# Patient Record
Sex: MALE | Race: WHITE | NOT HISPANIC OR LATINO | URBAN - NONMETROPOLITAN AREA
[De-identification: names, ages, dates, MRNs, and addresses within clinical notes are randomized per-mention and may not be internally consistent; named-entity substitution may affect disease eponyms.]

---

## 2023-08-26 LAB
ALANINE AMINOTRANSFERASE (SGPT) (U/L) IN SER/PLAS: 26 U/L (ref 10–52)
ALBUMIN (G/DL) IN SER/PLAS: 4.5 G/DL (ref 3.4–5)
ALKALINE PHOSPHATASE (U/L) IN SER/PLAS: 87 U/L (ref 33–136)
ANION GAP IN SER/PLAS: 16 MMOL/L (ref 10–20)
ASPARTATE AMINOTRANSFERASE (SGOT) (U/L) IN SER/PLAS: 20 U/L (ref 9–39)
BASOPHILS (10*3/UL) IN BLOOD BY AUTOMATED COUNT: 0.04 X10E9/L (ref 0–0.1)
BASOPHILS/100 LEUKOCYTES IN BLOOD BY AUTOMATED COUNT: 0.5 % (ref 0–2)
BILIRUBIN TOTAL (MG/DL) IN SER/PLAS: 0.7 MG/DL (ref 0–1.2)
CALCIUM (MG/DL) IN SER/PLAS: 10.4 MG/DL (ref 8.6–10.6)
CARBON DIOXIDE, TOTAL (MMOL/L) IN SER/PLAS: 24 MMOL/L (ref 21–32)
CHLORIDE (MMOL/L) IN SER/PLAS: 105 MMOL/L (ref 98–107)
CORTISOL (UG/DL) IN SERUM: 26.9 UG/DL (ref 2.5–20)
CREATININE (MG/DL) IN SER/PLAS: 1.06 MG/DL (ref 0.5–1.3)
EOSINOPHILS (10*3/UL) IN BLOOD BY AUTOMATED COUNT: 0.2 X10E9/L (ref 0–0.4)
EOSINOPHILS/100 LEUKOCYTES IN BLOOD BY AUTOMATED COUNT: 2.4 % (ref 0–6)
ERYTHROCYTE DISTRIBUTION WIDTH (RATIO) BY AUTOMATED COUNT: 13.4 % (ref 11.5–14.5)
ERYTHROCYTE MEAN CORPUSCULAR HEMOGLOBIN CONCENTRATION (G/DL) BY AUTOMATED: 32 G/DL (ref 32–36)
ERYTHROCYTE MEAN CORPUSCULAR VOLUME (FL) BY AUTOMATED COUNT: 90 FL (ref 80–100)
ERYTHROCYTES (10*6/UL) IN BLOOD BY AUTOMATED COUNT: 5.1 X10E12/L (ref 4.5–5.9)
FERRITIN (UG/LL) IN SER/PLAS: 361 UG/L (ref 20–300)
GFR MALE: 74 ML/MIN/1.73M2
GLUCOSE (MG/DL) IN SER/PLAS: 134 MG/DL (ref 74–99)
HEMATOCRIT (%) IN BLOOD BY AUTOMATED COUNT: 45.9 % (ref 41–52)
HEMOGLOBIN (G/DL) IN BLOOD: 14.7 G/DL (ref 13.5–17.5)
IMMATURE GRANULOCYTES/100 LEUKOCYTES IN BLOOD BY AUTOMATED COUNT: 0.5 % (ref 0–0.9)
IRON (UG/DL) IN SER/PLAS: 68 UG/DL (ref 35–150)
IRON BINDING CAPACITY (UG/DL) IN SER/PLAS: 300 UG/DL (ref 240–445)
IRON SATURATION (%) IN SER/PLAS: 23 % (ref 25–45)
LEUKOCYTES (10*3/UL) IN BLOOD BY AUTOMATED COUNT: 8.3 X10E9/L (ref 4.4–11.3)
LYMPHOCYTES (10*3/UL) IN BLOOD BY AUTOMATED COUNT: 1.99 X10E9/L (ref 0.8–3)
LYMPHOCYTES/100 LEUKOCYTES IN BLOOD BY AUTOMATED COUNT: 23.9 % (ref 13–44)
MONOCYTES (10*3/UL) IN BLOOD BY AUTOMATED COUNT: 0.77 X10E9/L (ref 0.05–0.8)
MONOCYTES/100 LEUKOCYTES IN BLOOD BY AUTOMATED COUNT: 9.3 % (ref 2–10)
NEUTROPHILS (10*3/UL) IN BLOOD BY AUTOMATED COUNT: 5.27 X10E9/L (ref 1.6–5.5)
NEUTROPHILS/100 LEUKOCYTES IN BLOOD BY AUTOMATED COUNT: 63.4 % (ref 40–80)
NRBC (PER 100 WBCS) BY AUTOMATED COUNT: 0 /100 WBC (ref 0–0)
PLATELETS (10*3/UL) IN BLOOD AUTOMATED COUNT: 256 X10E9/L (ref 150–450)
POTASSIUM (MMOL/L) IN SER/PLAS: 4.9 MMOL/L (ref 3.5–5.3)
PROTEIN TOTAL: 7 G/DL (ref 6.4–8.2)
SODIUM (MMOL/L) IN SER/PLAS: 140 MMOL/L (ref 136–145)
THYROTROPIN (MIU/L) IN SER/PLAS BY DETECTION LIMIT <= 0.05 MIU/L: 2.02 MIU/L (ref 0.44–3.98)
UREA NITROGEN (MG/DL) IN SER/PLAS: 21 MG/DL (ref 6–23)

## 2023-09-13 DIAGNOSIS — C67.9 MALIGNANT NEOPLASM OF URINARY BLADDER, UNSPECIFIED SITE (MULTI): Primary | ICD-10-CM

## 2023-09-25 VITALS — WEIGHT: 223.33 LBS | BODY MASS INDEX: 39.57 KG/M2 | HEIGHT: 63 IN

## 2023-09-25 PROBLEM — C67.9 BLADDER CANCER (MULTI): Status: ACTIVE | Noted: 2023-09-25

## 2023-09-25 RX ORDER — HEPARIN SODIUM,PORCINE/PF 10 UNIT/ML
50 SYRINGE (ML) INTRAVENOUS AS NEEDED
Status: CANCELLED | OUTPATIENT
Start: 2023-10-02

## 2023-09-25 RX ORDER — HEPARIN 100 UNIT/ML
500 SYRINGE INTRAVENOUS AS NEEDED
Status: CANCELLED | OUTPATIENT
Start: 2023-10-02

## 2023-10-03 RX ORDER — FAMOTIDINE 10 MG/ML
20 INJECTION INTRAVENOUS ONCE
Status: CANCELLED
Start: 2023-10-09 | End: 2023-10-09

## 2023-10-03 RX ORDER — ALBUTEROL SULFATE 0.83 MG/ML
3 SOLUTION RESPIRATORY (INHALATION) AS NEEDED
Status: CANCELLED | OUTPATIENT
Start: 2023-10-30

## 2023-10-03 RX ORDER — DEXAMETHASONE 4 MG/1
TABLET ORAL
Qty: 6 TABLET | Refills: 0 | Status: SHIPPED | OUTPATIENT
Start: 2023-10-03 | End: 2023-12-05 | Stop reason: ALTCHOICE

## 2023-10-03 RX ORDER — ALBUTEROL SULFATE 0.83 MG/ML
3 SOLUTION RESPIRATORY (INHALATION) AS NEEDED
Status: CANCELLED | OUTPATIENT
Start: 2023-11-06

## 2023-10-03 RX ORDER — PROCHLORPERAZINE EDISYLATE 5 MG/ML
10 INJECTION INTRAMUSCULAR; INTRAVENOUS EVERY 6 HOURS PRN
Status: CANCELLED | OUTPATIENT
Start: 2023-10-23

## 2023-10-03 RX ORDER — FAMOTIDINE 10 MG/ML
20 INJECTION INTRAVENOUS ONCE
Status: CANCELLED
Start: 2023-10-30 | End: 2023-10-30

## 2023-10-03 RX ORDER — FAMOTIDINE 10 MG/ML
20 INJECTION INTRAVENOUS ONCE AS NEEDED
Status: CANCELLED | OUTPATIENT
Start: 2023-10-16

## 2023-10-03 RX ORDER — PROCHLORPERAZINE EDISYLATE 5 MG/ML
10 INJECTION INTRAMUSCULAR; INTRAVENOUS EVERY 6 HOURS PRN
Status: CANCELLED | OUTPATIENT
Start: 2023-11-13

## 2023-10-03 RX ORDER — FAMOTIDINE 10 MG/ML
20 INJECTION INTRAVENOUS ONCE AS NEEDED
Status: CANCELLED | OUTPATIENT
Start: 2023-10-30

## 2023-10-03 RX ORDER — EPINEPHRINE 0.3 MG/.3ML
0.3 INJECTION SUBCUTANEOUS EVERY 5 MIN PRN
Status: CANCELLED | OUTPATIENT
Start: 2023-10-09

## 2023-10-03 RX ORDER — PROCHLORPERAZINE MALEATE 5 MG
10 TABLET ORAL EVERY 6 HOURS PRN
Status: CANCELLED | OUTPATIENT
Start: 2023-11-13

## 2023-10-03 RX ORDER — PROCHLORPERAZINE MALEATE 5 MG
10 TABLET ORAL EVERY 6 HOURS PRN
Status: CANCELLED | OUTPATIENT
Start: 2023-10-09

## 2023-10-03 RX ORDER — DIPHENHYDRAMINE HCL 25 MG
50 CAPSULE ORAL ONCE
Status: CANCELLED
Start: 2023-10-09 | End: 2023-10-09

## 2023-10-03 RX ORDER — PALONOSETRON 0.05 MG/ML
0.25 INJECTION, SOLUTION INTRAVENOUS ONCE
Status: CANCELLED | OUTPATIENT
Start: 2023-10-09

## 2023-10-03 RX ORDER — FAMOTIDINE 10 MG/ML
20 INJECTION INTRAVENOUS ONCE AS NEEDED
Status: CANCELLED | OUTPATIENT
Start: 2023-10-09

## 2023-10-03 RX ORDER — DIPHENHYDRAMINE HYDROCHLORIDE 50 MG/ML
50 INJECTION INTRAMUSCULAR; INTRAVENOUS AS NEEDED
Status: CANCELLED | OUTPATIENT
Start: 2023-11-13

## 2023-10-03 RX ORDER — PROCHLORPERAZINE EDISYLATE 5 MG/ML
10 INJECTION INTRAMUSCULAR; INTRAVENOUS EVERY 6 HOURS PRN
Status: CANCELLED | OUTPATIENT
Start: 2023-10-16

## 2023-10-03 RX ORDER — PROCHLORPERAZINE MALEATE 5 MG
10 TABLET ORAL EVERY 6 HOURS PRN
Status: CANCELLED | OUTPATIENT
Start: 2023-11-06

## 2023-10-03 RX ORDER — PROCHLORPERAZINE MALEATE 5 MG
10 TABLET ORAL EVERY 6 HOURS PRN
Status: CANCELLED | OUTPATIENT
Start: 2023-10-16

## 2023-10-03 RX ORDER — ALBUTEROL SULFATE 0.83 MG/ML
3 SOLUTION RESPIRATORY (INHALATION) AS NEEDED
Status: CANCELLED | OUTPATIENT
Start: 2023-10-16

## 2023-10-03 RX ORDER — PROCHLORPERAZINE MALEATE 5 MG
10 TABLET ORAL EVERY 6 HOURS PRN
Status: CANCELLED | OUTPATIENT
Start: 2023-10-30

## 2023-10-03 RX ORDER — PALONOSETRON 0.05 MG/ML
0.25 INJECTION, SOLUTION INTRAVENOUS ONCE
Status: CANCELLED | OUTPATIENT
Start: 2023-10-30

## 2023-10-03 RX ORDER — DIPHENHYDRAMINE HYDROCHLORIDE 50 MG/ML
50 INJECTION INTRAMUSCULAR; INTRAVENOUS AS NEEDED
Status: CANCELLED | OUTPATIENT
Start: 2023-11-06

## 2023-10-03 RX ORDER — FAMOTIDINE 10 MG/ML
20 INJECTION INTRAVENOUS ONCE AS NEEDED
Status: CANCELLED | OUTPATIENT
Start: 2023-11-06

## 2023-10-03 RX ORDER — ALBUTEROL SULFATE 0.83 MG/ML
3 SOLUTION RESPIRATORY (INHALATION) AS NEEDED
Status: CANCELLED | OUTPATIENT
Start: 2023-10-09

## 2023-10-03 RX ORDER — PALONOSETRON 0.05 MG/ML
0.25 INJECTION, SOLUTION INTRAVENOUS ONCE
Status: CANCELLED | OUTPATIENT
Start: 2023-11-06

## 2023-10-03 RX ORDER — DIPHENHYDRAMINE HYDROCHLORIDE 50 MG/ML
50 INJECTION INTRAMUSCULAR; INTRAVENOUS AS NEEDED
Status: CANCELLED | OUTPATIENT
Start: 2023-10-30

## 2023-10-03 RX ORDER — FAMOTIDINE 10 MG/ML
20 INJECTION INTRAVENOUS ONCE AS NEEDED
Status: CANCELLED | OUTPATIENT
Start: 2023-10-23

## 2023-10-03 RX ORDER — PROCHLORPERAZINE EDISYLATE 5 MG/ML
10 INJECTION INTRAMUSCULAR; INTRAVENOUS EVERY 6 HOURS PRN
Status: CANCELLED | OUTPATIENT
Start: 2023-10-09

## 2023-10-03 RX ORDER — PALONOSETRON 0.05 MG/ML
0.25 INJECTION, SOLUTION INTRAVENOUS ONCE
Status: CANCELLED | OUTPATIENT
Start: 2023-11-13

## 2023-10-03 RX ORDER — PROCHLORPERAZINE EDISYLATE 5 MG/ML
10 INJECTION INTRAMUSCULAR; INTRAVENOUS EVERY 6 HOURS PRN
Status: CANCELLED | OUTPATIENT
Start: 2023-10-30

## 2023-10-03 RX ORDER — PALONOSETRON 0.05 MG/ML
0.25 INJECTION, SOLUTION INTRAVENOUS ONCE
Status: CANCELLED | OUTPATIENT
Start: 2023-10-16

## 2023-10-03 RX ORDER — EPINEPHRINE 0.3 MG/.3ML
0.3 INJECTION SUBCUTANEOUS EVERY 5 MIN PRN
Status: CANCELLED | OUTPATIENT
Start: 2023-10-30

## 2023-10-03 RX ORDER — FAMOTIDINE 10 MG/ML
20 INJECTION INTRAVENOUS ONCE
Status: CANCELLED
Start: 2023-10-16 | End: 2023-10-16

## 2023-10-03 RX ORDER — FAMOTIDINE 10 MG/ML
20 INJECTION INTRAVENOUS ONCE
Status: CANCELLED
Start: 2023-10-23 | End: 2023-10-23

## 2023-10-03 RX ORDER — PALONOSETRON 0.05 MG/ML
0.25 INJECTION, SOLUTION INTRAVENOUS ONCE
Status: CANCELLED | OUTPATIENT
Start: 2023-10-23

## 2023-10-03 RX ORDER — DIPHENHYDRAMINE HYDROCHLORIDE 50 MG/ML
50 INJECTION INTRAMUSCULAR; INTRAVENOUS AS NEEDED
Status: CANCELLED | OUTPATIENT
Start: 2023-10-16

## 2023-10-03 RX ORDER — PROCHLORPERAZINE MALEATE 5 MG
10 TABLET ORAL EVERY 6 HOURS PRN
Status: CANCELLED | OUTPATIENT
Start: 2023-10-23

## 2023-10-03 RX ORDER — FAMOTIDINE 10 MG/ML
20 INJECTION INTRAVENOUS ONCE AS NEEDED
Status: CANCELLED | OUTPATIENT
Start: 2023-11-13

## 2023-10-03 RX ORDER — PROCHLORPERAZINE EDISYLATE 5 MG/ML
10 INJECTION INTRAMUSCULAR; INTRAVENOUS EVERY 6 HOURS PRN
Status: CANCELLED | OUTPATIENT
Start: 2023-11-06

## 2023-10-03 RX ORDER — EPINEPHRINE 0.3 MG/.3ML
0.3 INJECTION SUBCUTANEOUS EVERY 5 MIN PRN
Status: CANCELLED | OUTPATIENT
Start: 2023-10-23

## 2023-10-03 RX ORDER — EPINEPHRINE 0.3 MG/.3ML
0.3 INJECTION SUBCUTANEOUS EVERY 5 MIN PRN
Status: CANCELLED | OUTPATIENT
Start: 2023-10-16

## 2023-10-03 RX ORDER — FAMOTIDINE 10 MG/ML
20 INJECTION INTRAVENOUS ONCE
Status: CANCELLED
Start: 2023-11-13 | End: 2023-11-13

## 2023-10-03 RX ORDER — DIPHENHYDRAMINE HYDROCHLORIDE 50 MG/ML
50 INJECTION INTRAMUSCULAR; INTRAVENOUS AS NEEDED
Status: CANCELLED | OUTPATIENT
Start: 2023-10-23

## 2023-10-03 RX ORDER — ALBUTEROL SULFATE 0.83 MG/ML
3 SOLUTION RESPIRATORY (INHALATION) AS NEEDED
Status: CANCELLED | OUTPATIENT
Start: 2023-11-13

## 2023-10-03 RX ORDER — FAMOTIDINE 10 MG/ML
20 INJECTION INTRAVENOUS ONCE
Status: CANCELLED
Start: 2023-11-06 | End: 2023-11-06

## 2023-10-03 RX ORDER — EPINEPHRINE 0.3 MG/.3ML
0.3 INJECTION SUBCUTANEOUS EVERY 5 MIN PRN
Status: CANCELLED | OUTPATIENT
Start: 2023-11-13

## 2023-10-03 RX ORDER — DIPHENHYDRAMINE HYDROCHLORIDE 50 MG/ML
50 INJECTION INTRAMUSCULAR; INTRAVENOUS AS NEEDED
Status: CANCELLED | OUTPATIENT
Start: 2023-10-09

## 2023-10-03 RX ORDER — ALBUTEROL SULFATE 0.83 MG/ML
3 SOLUTION RESPIRATORY (INHALATION) AS NEEDED
Status: CANCELLED | OUTPATIENT
Start: 2023-10-23

## 2023-10-03 RX ORDER — EPINEPHRINE 0.3 MG/.3ML
0.3 INJECTION SUBCUTANEOUS EVERY 5 MIN PRN
Status: CANCELLED | OUTPATIENT
Start: 2023-11-06

## 2023-10-06 ENCOUNTER — LAB (OUTPATIENT)
Dept: LAB | Facility: CLINIC | Age: 73
End: 2023-10-06
Payer: MEDICARE

## 2023-10-06 ENCOUNTER — TELEPHONE (OUTPATIENT)
Dept: HEMATOLOGY/ONCOLOGY | Facility: HOSPITAL | Age: 73
End: 2023-10-06

## 2023-10-06 DIAGNOSIS — C67.9 MALIGNANT NEOPLASM OF URINARY BLADDER, UNSPECIFIED SITE (MULTI): Primary | ICD-10-CM

## 2023-10-06 PROBLEM — G89.29 CHRONIC PAIN IN LEFT FOOT: Status: ACTIVE | Noted: 2023-10-06

## 2023-10-06 PROBLEM — G89.29 CHRONIC PAIN OF BOTH ANKLES: Status: ACTIVE | Noted: 2023-10-06

## 2023-10-06 PROBLEM — M79.671 CHRONIC PAIN IN RIGHT FOOT: Status: ACTIVE | Noted: 2023-10-06

## 2023-10-06 PROBLEM — M19.072 PRIMARY OSTEOARTHRITIS OF BOTH FEET: Status: ACTIVE | Noted: 2023-10-06

## 2023-10-06 PROBLEM — M19.071 PRIMARY OSTEOARTHRITIS OF BOTH FEET: Status: ACTIVE | Noted: 2023-10-06

## 2023-10-06 PROBLEM — M79.672 CHRONIC PAIN IN LEFT FOOT: Status: ACTIVE | Noted: 2023-10-06

## 2023-10-06 PROBLEM — R73.9 HYPERGLYCEMIA: Status: ACTIVE | Noted: 2023-10-06

## 2023-10-06 PROBLEM — M25.571 CHRONIC PAIN OF BOTH ANKLES: Status: ACTIVE | Noted: 2023-10-06

## 2023-10-06 PROBLEM — E78.00 HYPERCHOLESTEROLEMIA: Status: ACTIVE | Noted: 2023-10-06

## 2023-10-06 PROBLEM — G89.29 CHRONIC PAIN IN RIGHT FOOT: Status: ACTIVE | Noted: 2023-10-06

## 2023-10-06 PROBLEM — M25.572 CHRONIC PAIN OF BOTH ANKLES: Status: ACTIVE | Noted: 2023-10-06

## 2023-10-06 PROBLEM — M10.9 GOUT: Status: ACTIVE | Noted: 2023-10-06

## 2023-10-06 PROBLEM — G62.9 PERIPHERAL NEUROPATHY: Status: ACTIVE | Noted: 2023-10-06

## 2023-10-06 PROBLEM — I25.10 ASHD (ARTERIOSCLEROTIC HEART DISEASE): Status: ACTIVE | Noted: 2023-10-06

## 2023-10-06 PROBLEM — M17.0 PRIMARY OSTEOARTHRITIS OF BOTH KNEES: Status: ACTIVE | Noted: 2023-10-06

## 2023-10-06 PROBLEM — I10 HTN (HYPERTENSION): Status: ACTIVE | Noted: 2023-10-06

## 2023-10-06 PROBLEM — M19.90 OSTEOARTHRITIS: Status: ACTIVE | Noted: 2023-10-06

## 2023-10-06 LAB
BASOPHILS # BLD AUTO: 0.04 X10*3/UL (ref 0–0.1)
BASOPHILS NFR BLD AUTO: 0.5 %
EOSINOPHIL # BLD AUTO: 0.22 X10*3/UL (ref 0–0.4)
EOSINOPHIL NFR BLD AUTO: 3 %
ERYTHROCYTE [DISTWIDTH] IN BLOOD BY AUTOMATED COUNT: 14 % (ref 11.5–14.5)
HCT VFR BLD AUTO: 40.9 % (ref 41–52)
HGB BLD-MCNC: 13.6 G/DL (ref 13.5–17.5)
IMM GRANULOCYTES # BLD AUTO: 0.02 X10*3/UL (ref 0–0.5)
IMM GRANULOCYTES NFR BLD AUTO: 0.3 % (ref 0–0.9)
LYMPHOCYTES # BLD AUTO: 1.93 X10*3/UL (ref 0.8–3)
LYMPHOCYTES NFR BLD AUTO: 26.1 %
MCH RBC QN AUTO: 30.2 PG (ref 26–34)
MCHC RBC AUTO-ENTMCNC: 33.3 G/DL (ref 32–36)
MCV RBC AUTO: 91 FL (ref 80–100)
MONOCYTES # BLD AUTO: 0.63 X10*3/UL (ref 0.05–0.8)
MONOCYTES NFR BLD AUTO: 8.5 %
NEUTROPHILS # BLD AUTO: 4.56 X10*3/UL (ref 1.6–5.5)
NEUTROPHILS NFR BLD AUTO: 61.6 %
NRBC BLD-RTO: ABNORMAL /100{WBCS}
PLATELET # BLD AUTO: 201 X10*3/UL (ref 150–450)
PMV BLD AUTO: 10.1 FL (ref 7.5–11.5)
RBC # BLD AUTO: 4.51 X10*6/UL (ref 4.5–5.9)
WBC # BLD AUTO: 7.4 X10*3/UL (ref 4.4–11.3)

## 2023-10-06 PROCEDURE — 36415 COLL VENOUS BLD VENIPUNCTURE: CPT

## 2023-10-06 PROCEDURE — 80053 COMPREHEN METABOLIC PANEL: CPT | Performed by: INTERNAL MEDICINE

## 2023-10-06 PROCEDURE — 83735 ASSAY OF MAGNESIUM: CPT | Performed by: INTERNAL MEDICINE

## 2023-10-06 PROCEDURE — 86706 HEP B SURFACE ANTIBODY: CPT | Performed by: INTERNAL MEDICINE

## 2023-10-06 PROCEDURE — 86704 HEP B CORE ANTIBODY TOTAL: CPT | Performed by: INTERNAL MEDICINE

## 2023-10-06 PROCEDURE — 87340 HEPATITIS B SURFACE AG IA: CPT | Performed by: INTERNAL MEDICINE

## 2023-10-06 PROCEDURE — 85025 COMPLETE CBC W/AUTO DIFF WBC: CPT | Performed by: INTERNAL MEDICINE

## 2023-10-06 RX ORDER — LISINOPRIL 20 MG/1
20 TABLET ORAL DAILY
COMMUNITY
Start: 2020-03-06

## 2023-10-06 RX ORDER — ALLOPURINOL 100 MG/1
100 TABLET ORAL 2 TIMES DAILY
COMMUNITY

## 2023-10-06 RX ORDER — TRIAMTERENE AND HYDROCHLOROTHIAZIDE 37.5; 25 MG/1; MG/1
1 CAPSULE ORAL EVERY OTHER DAY
COMMUNITY
Start: 2016-02-05

## 2023-10-06 RX ORDER — NITROGLYCERIN 0.4 MG/1
0.4 TABLET SUBLINGUAL EVERY 5 MIN PRN
COMMUNITY
Start: 2023-09-15 | End: 2023-12-05 | Stop reason: ALTCHOICE

## 2023-10-06 RX ORDER — ALENDRONATE SODIUM 70 MG/75ML
70 SOLUTION ORAL
COMMUNITY
End: 2023-12-05 | Stop reason: ALTCHOICE

## 2023-10-06 RX ORDER — ATORVASTATIN CALCIUM 20 MG/1
20 TABLET, FILM COATED ORAL DAILY
COMMUNITY
Start: 2017-11-20

## 2023-10-06 RX ORDER — METOPROLOL SUCCINATE 50 MG/1
50 TABLET, EXTENDED RELEASE ORAL DAILY
COMMUNITY
Start: 2014-10-27

## 2023-10-06 RX ORDER — PHENYLEPHRINE HCL 10 MG
TABLET ORAL
COMMUNITY

## 2023-10-06 RX ORDER — PEGLOTICASE 8 MG/ML
INJECTION, SOLUTION INTRAVENOUS
COMMUNITY
End: 2023-12-05 | Stop reason: ALTCHOICE

## 2023-10-06 RX ORDER — TETRACYCLINE HCL 500 MG
CAPSULE ORAL
COMMUNITY
End: 2023-12-05 | Stop reason: ALTCHOICE

## 2023-10-06 RX ORDER — VIT C/E/ZN/COPPR/LUTEIN/ZEAXAN 250MG-90MG
1000 CAPSULE ORAL DAILY
COMMUNITY
Start: 2019-08-31 | End: 2023-11-02 | Stop reason: SDUPTHER

## 2023-10-06 RX ORDER — ONDANSETRON 8 MG/1
8 TABLET, ORALLY DISINTEGRATING ORAL 3 TIMES DAILY PRN
COMMUNITY
Start: 2023-09-28 | End: 2023-12-05 | Stop reason: ALTCHOICE

## 2023-10-06 RX ORDER — METHOTREXATE 2.5 MG/1
6 TABLET ORAL
COMMUNITY
End: 2023-12-05 | Stop reason: ALTCHOICE

## 2023-10-06 RX ORDER — IODINE/SODIUM IODIDE 2 %
TINCTURE TOPICAL
COMMUNITY
End: 2023-12-05 | Stop reason: ALTCHOICE

## 2023-10-06 RX ORDER — PROCHLORPERAZINE MALEATE 10 MG
10 TABLET ORAL 3 TIMES DAILY PRN
COMMUNITY
Start: 2023-09-28 | End: 2023-12-05 | Stop reason: ALTCHOICE

## 2023-10-06 RX ORDER — ASPIRIN 81 MG/1
81 TABLET ORAL DAILY
COMMUNITY
Start: 2020-05-01

## 2023-10-06 RX ORDER — TURMERIC/TURMERIC EXT/PEPR EXT 900-100 MG
CAPSULE ORAL DAILY
COMMUNITY
End: 2023-12-05 | Stop reason: ALTCHOICE

## 2023-10-06 RX ORDER — COLCHICINE 0.6 MG/1
0.6 TABLET, FILM COATED ORAL DAILY
COMMUNITY
End: 2023-12-05 | Stop reason: ALTCHOICE

## 2023-10-06 RX ORDER — FOLIC ACID 1 MG/1
1 TABLET ORAL DAILY
COMMUNITY
End: 2023-12-05 | Stop reason: ALTCHOICE

## 2023-10-06 NOTE — TELEPHONE ENCOUNTER
Patient would like to speak with Geovani.  He has a few questions.  Please remind patient to leave .

## 2023-10-06 NOTE — TELEPHONE ENCOUNTER
Call returned to patient.  Patient had several questions regarding chemo class.  Questions addressed.  Call back instructions reviewed.  Patient verbalized understanding.

## 2023-10-07 LAB
ALBUMIN SERPL BCP-MCNC: 4.2 G/DL (ref 3.4–5)
ALP SERPL-CCNC: 92 U/L (ref 33–136)
ALT SERPL W P-5'-P-CCNC: 22 U/L (ref 10–52)
ANION GAP SERPL CALC-SCNC: 15 MMOL/L (ref 10–20)
AST SERPL W P-5'-P-CCNC: 18 U/L (ref 9–39)
BILIRUB SERPL-MCNC: 0.8 MG/DL (ref 0–1.2)
BUN SERPL-MCNC: 42 MG/DL (ref 6–23)
CALCIUM SERPL-MCNC: 10.1 MG/DL (ref 8.6–10.6)
CHLORIDE SERPL-SCNC: 103 MMOL/L (ref 98–107)
CO2 SERPL-SCNC: 24 MMOL/L (ref 21–32)
CREAT SERPL-MCNC: 1.5 MG/DL (ref 0.5–1.3)
GFR SERPL CREATININE-BSD FRML MDRD: 49 ML/MIN/1.73M*2
GLUCOSE SERPL-MCNC: 118 MG/DL (ref 74–99)
HBV CORE AB SER QL: NONREACTIVE
HBV SURFACE AB SER-ACNC: <3.1 MIU/ML
HBV SURFACE AG SERPL QL IA: NONREACTIVE
MAGNESIUM SERPL-MCNC: 1.87 MG/DL (ref 1.6–2.4)
POTASSIUM SERPL-SCNC: 4.8 MMOL/L (ref 3.5–5.3)
PROT SERPL-MCNC: 6.8 G/DL (ref 6.4–8.2)
SODIUM SERPL-SCNC: 137 MMOL/L (ref 136–145)

## 2023-10-09 ENCOUNTER — INFUSION (OUTPATIENT)
Dept: HEMATOLOGY/ONCOLOGY | Facility: CLINIC | Age: 73
End: 2023-10-09
Payer: MEDICARE

## 2023-10-09 VITALS
WEIGHT: 219.36 LBS | OXYGEN SATURATION: 97 % | TEMPERATURE: 97.2 F | DIASTOLIC BLOOD PRESSURE: 80 MMHG | RESPIRATION RATE: 20 BRPM | HEIGHT: 64 IN | HEART RATE: 86 BPM | SYSTOLIC BLOOD PRESSURE: 148 MMHG | BODY MASS INDEX: 37.45 KG/M2

## 2023-10-09 DIAGNOSIS — C67.9 MALIGNANT NEOPLASM OF URINARY BLADDER, UNSPECIFIED SITE (MULTI): ICD-10-CM

## 2023-10-09 PROCEDURE — 96368 THER/DIAG CONCURRENT INF: CPT

## 2023-10-09 PROCEDURE — 2500000004 HC RX 250 GENERAL PHARMACY W/ HCPCS (ALT 636 FOR OP/ED): Performed by: INTERNAL MEDICINE

## 2023-10-09 PROCEDURE — 96366 THER/PROPH/DIAG IV INF ADDON: CPT

## 2023-10-09 PROCEDURE — 96417 CHEMO IV INFUS EACH ADDL SEQ: CPT

## 2023-10-09 PROCEDURE — 2500000004 HC RX 250 GENERAL PHARMACY W/ HCPCS (ALT 636 FOR OP/ED): Mod: JZ | Performed by: INTERNAL MEDICINE

## 2023-10-09 PROCEDURE — 2500000001 HC RX 250 WO HCPCS SELF ADMINISTERED DRUGS (ALT 637 FOR MEDICARE OP): Performed by: INTERNAL MEDICINE

## 2023-10-09 PROCEDURE — 96375 TX/PRO/DX INJ NEW DRUG ADDON: CPT

## 2023-10-09 PROCEDURE — 96413 CHEMO IV INFUSION 1 HR: CPT

## 2023-10-09 PROCEDURE — 96367 TX/PROPH/DG ADDL SEQ IV INF: CPT

## 2023-10-09 RX ORDER — FAMOTIDINE 10 MG/ML
20 INJECTION INTRAVENOUS ONCE AS NEEDED
Status: DISCONTINUED | OUTPATIENT
Start: 2023-10-09 | End: 2023-10-09 | Stop reason: HOSPADM

## 2023-10-09 RX ORDER — HEPARIN 100 UNIT/ML
500 SYRINGE INTRAVENOUS AS NEEDED
Status: CANCELLED | OUTPATIENT
Start: 2023-10-09

## 2023-10-09 RX ORDER — EPINEPHRINE 0.3 MG/.3ML
0.3 INJECTION SUBCUTANEOUS EVERY 5 MIN PRN
Status: DISCONTINUED | OUTPATIENT
Start: 2023-10-09 | End: 2023-10-09 | Stop reason: HOSPADM

## 2023-10-09 RX ORDER — DIPHENHYDRAMINE HCL 50 MG
50 CAPSULE ORAL ONCE
Status: COMPLETED | OUTPATIENT
Start: 2023-10-09 | End: 2023-10-09

## 2023-10-09 RX ORDER — PALONOSETRON 0.05 MG/ML
0.25 INJECTION, SOLUTION INTRAVENOUS ONCE
Status: COMPLETED | OUTPATIENT
Start: 2023-10-09 | End: 2023-10-09

## 2023-10-09 RX ORDER — HEPARIN SODIUM,PORCINE/PF 10 UNIT/ML
50 SYRINGE (ML) INTRAVENOUS AS NEEDED
Status: CANCELLED | OUTPATIENT
Start: 2023-10-09

## 2023-10-09 RX ORDER — ALBUTEROL SULFATE 0.83 MG/ML
3 SOLUTION RESPIRATORY (INHALATION) AS NEEDED
Status: DISCONTINUED | OUTPATIENT
Start: 2023-10-09 | End: 2023-10-09 | Stop reason: HOSPADM

## 2023-10-09 RX ORDER — PROCHLORPERAZINE MALEATE 10 MG
10 TABLET ORAL EVERY 6 HOURS PRN
Status: DISCONTINUED | OUTPATIENT
Start: 2023-10-09 | End: 2023-10-09 | Stop reason: HOSPADM

## 2023-10-09 RX ORDER — PROCHLORPERAZINE EDISYLATE 5 MG/ML
10 INJECTION INTRAMUSCULAR; INTRAVENOUS EVERY 6 HOURS PRN
Status: DISCONTINUED | OUTPATIENT
Start: 2023-10-09 | End: 2023-10-09 | Stop reason: HOSPADM

## 2023-10-09 RX ORDER — DIPHENHYDRAMINE HYDROCHLORIDE 50 MG/ML
50 INJECTION INTRAMUSCULAR; INTRAVENOUS AS NEEDED
Status: DISCONTINUED | OUTPATIENT
Start: 2023-10-09 | End: 2023-10-09 | Stop reason: HOSPADM

## 2023-10-09 RX ORDER — POVIDONE-IODINE 5 %
1 SOLUTION, NON-ORAL OPHTHALMIC (EYE) AS NEEDED
COMMUNITY
End: 2023-12-05 | Stop reason: ALTCHOICE

## 2023-10-09 RX ORDER — FAMOTIDINE 10 MG/ML
20 INJECTION INTRAVENOUS ONCE
Status: COMPLETED | OUTPATIENT
Start: 2023-10-09 | End: 2023-10-09

## 2023-10-09 RX ADMIN — DIPHENHYDRAMINE HYDROCHLORIDE 50 MG: 50 CAPSULE ORAL at 09:57

## 2023-10-09 RX ADMIN — PALONOSETRON 250 MCG: 0.05 INJECTION, SOLUTION INTRAVENOUS at 09:57

## 2023-10-09 RX ADMIN — FAMOTIDINE 20 MG: 10 INJECTION, SOLUTION INTRAVENOUS at 09:56

## 2023-10-09 RX ADMIN — DEXAMETHASONE SODIUM PHOSPHATE 20 MG: 4 INJECTION, SOLUTION INTRA-ARTICULAR; INTRALESIONAL; INTRAMUSCULAR; INTRAVENOUS; SOFT TISSUE at 10:53

## 2023-10-09 RX ADMIN — POTASSIUM CHLORIDE 500 ML/HR: 2 INJECTION, SOLUTION, CONCENTRATE INTRAVENOUS at 13:13

## 2023-10-09 RX ADMIN — CISPLATIN 42 MG: 50 INJECTION, SOLUTION INTRAVENOUS at 11:59

## 2023-10-09 RX ADMIN — FOSAPREPITANT 150 MG: 150 INJECTION, POWDER, LYOPHILIZED, FOR SOLUTION INTRAVENOUS at 11:10

## 2023-10-09 RX ADMIN — PACLITAXEL 105 MG: 6 INJECTION, SOLUTION INTRAVENOUS at 13:13

## 2023-10-09 RX ADMIN — POTASSIUM CHLORIDE 500 ML/HR: 2 INJECTION, SOLUTION, CONCENTRATE INTRAVENOUS at 09:47

## 2023-10-09 ASSESSMENT — PAIN SCALES - GENERAL: PAINLEVEL: 0-NO PAIN

## 2023-10-09 NOTE — SIGNIFICANT EVENT
10/09/23 0918   Prechemo Checklist   Has the patient been in the hospital, ED, or urgent care since last date of service No   Chemo/Immuno Consent Signed Yes   Protocol/Indications Verified Yes   Confirmed to previous date/time of medication N/A   Compared to previous dose N/A   All medications are dated accurately Yes   Pregnancy Test Negative Not applicable   Parameters Met Yes   BSA/Weight-Height Verified Yes   Dose Calculations Verified Yes

## 2023-10-09 NOTE — TREATMENT PLAN
Discussed patients renal function with Dr. Jarquin, dose of Cisplatin decreased 50%.  Ok to treat with current renal function.

## 2023-10-09 NOTE — PROGRESS NOTES
Patient is here today for first infusion - no complication since last being seen.  Independent double check done prior to chemotherapy today.  B/L lung sounds not auscultated.  Denies current chest pain. No acute distress noted.  Patient verbalizes understanding of plan of care.     Patient tolerated infusion well.  Ambulated off unit - gait steady with cane.  no complaints. Call back instructions reviewed.  Verbalized understanding. Patient has daily radiation at 3:45pm - will return next Monday for week 2.

## 2023-10-10 ENCOUNTER — TELEPHONE (OUTPATIENT)
Dept: HEMATOLOGY/ONCOLOGY | Facility: CLINIC | Age: 73
End: 2023-10-10
Payer: MEDICARE

## 2023-10-11 NOTE — TELEPHONE ENCOUNTER
Patient calling to confirm he was able to change radiation appointment so he is a go for our appointment.

## 2023-10-12 RX ORDER — PROCHLORPERAZINE MALEATE 10 MG
10 TABLET ORAL EVERY 6 HOURS PRN
Status: CANCELLED | OUTPATIENT
Start: 2023-11-20

## 2023-10-12 RX ORDER — ALBUTEROL SULFATE 0.83 MG/ML
3 SOLUTION RESPIRATORY (INHALATION) AS NEEDED
Status: CANCELLED | OUTPATIENT
Start: 2023-11-20

## 2023-10-12 RX ORDER — PROCHLORPERAZINE EDISYLATE 5 MG/ML
10 INJECTION INTRAMUSCULAR; INTRAVENOUS EVERY 6 HOURS PRN
Status: CANCELLED | OUTPATIENT
Start: 2023-11-20

## 2023-10-12 RX ORDER — DIPHENHYDRAMINE HYDROCHLORIDE 50 MG/ML
50 INJECTION INTRAMUSCULAR; INTRAVENOUS AS NEEDED
Status: CANCELLED | OUTPATIENT
Start: 2023-11-20

## 2023-10-12 RX ORDER — EPINEPHRINE 0.3 MG/.3ML
0.3 INJECTION SUBCUTANEOUS EVERY 5 MIN PRN
Status: CANCELLED | OUTPATIENT
Start: 2023-11-20

## 2023-10-12 RX ORDER — PALONOSETRON 0.05 MG/ML
0.25 INJECTION, SOLUTION INTRAVENOUS ONCE
Status: CANCELLED | OUTPATIENT
Start: 2023-11-20

## 2023-10-12 RX ORDER — FAMOTIDINE 10 MG/ML
20 INJECTION INTRAVENOUS ONCE
Status: CANCELLED
Start: 2023-11-20 | End: 2023-11-20

## 2023-10-12 RX ORDER — FAMOTIDINE 10 MG/ML
20 INJECTION INTRAVENOUS ONCE AS NEEDED
Status: CANCELLED | OUTPATIENT
Start: 2023-11-20

## 2023-10-13 ENCOUNTER — LAB (OUTPATIENT)
Dept: LAB | Facility: CLINIC | Age: 73
End: 2023-10-13
Payer: MEDICARE

## 2023-10-13 DIAGNOSIS — C67.9 MALIGNANT NEOPLASM OF URINARY BLADDER, UNSPECIFIED SITE (MULTI): Primary | ICD-10-CM

## 2023-10-13 DIAGNOSIS — N28.9 RENAL INSUFFICIENCY: ICD-10-CM

## 2023-10-13 LAB
BASOPHILS # BLD AUTO: 0 X10*3/UL (ref 0–0.1)
BASOPHILS NFR BLD AUTO: 0 %
EOSINOPHIL # BLD AUTO: 0.05 X10*3/UL (ref 0–0.4)
EOSINOPHIL NFR BLD AUTO: 0.6 %
ERYTHROCYTE [DISTWIDTH] IN BLOOD BY AUTOMATED COUNT: 13.6 % (ref 11.5–14.5)
HCT VFR BLD AUTO: 38.2 % (ref 41–52)
HGB BLD-MCNC: 12.9 G/DL (ref 13.5–17.5)
IMM GRANULOCYTES # BLD AUTO: 0.02 X10*3/UL (ref 0–0.5)
IMM GRANULOCYTES NFR BLD AUTO: 0.2 % (ref 0–0.9)
LYMPHOCYTES # BLD AUTO: 1.65 X10*3/UL (ref 0.8–3)
LYMPHOCYTES NFR BLD AUTO: 19 %
MCH RBC QN AUTO: 30.4 PG (ref 26–34)
MCHC RBC AUTO-ENTMCNC: 33.8 G/DL (ref 32–36)
MCV RBC AUTO: 90 FL (ref 80–100)
MONOCYTES # BLD AUTO: 0.43 X10*3/UL (ref 0.05–0.8)
MONOCYTES NFR BLD AUTO: 4.9 %
NEUTROPHILS # BLD AUTO: 6.54 X10*3/UL (ref 1.6–5.5)
NEUTROPHILS NFR BLD AUTO: 75.3 %
NRBC BLD-RTO: ABNORMAL /100{WBCS}
PLATELET # BLD AUTO: 199 X10*3/UL (ref 150–450)
PMV BLD AUTO: 10.2 FL (ref 7.5–11.5)
RBC # BLD AUTO: 4.25 X10*6/UL (ref 4.5–5.9)
WBC # BLD AUTO: 8.7 X10*3/UL (ref 4.4–11.3)

## 2023-10-13 PROCEDURE — 85025 COMPLETE CBC W/AUTO DIFF WBC: CPT

## 2023-10-13 PROCEDURE — 80053 COMPREHEN METABOLIC PANEL: CPT

## 2023-10-13 PROCEDURE — 36415 COLL VENOUS BLD VENIPUNCTURE: CPT

## 2023-10-13 PROCEDURE — 83735 ASSAY OF MAGNESIUM: CPT

## 2023-10-13 PROCEDURE — 83735 ASSAY OF MAGNESIUM: CPT | Mod: CMCLAB

## 2023-10-13 PROCEDURE — 80053 COMPREHEN METABOLIC PANEL: CPT | Mod: CMCLAB

## 2023-10-14 LAB
ALBUMIN SERPL BCP-MCNC: 3.9 G/DL (ref 3.4–5)
ALP SERPL-CCNC: 77 U/L (ref 33–136)
ALT SERPL W P-5'-P-CCNC: 23 U/L (ref 10–52)
ANION GAP SERPL CALC-SCNC: 16 MMOL/L (ref 10–20)
AST SERPL W P-5'-P-CCNC: 17 U/L (ref 9–39)
BILIRUB SERPL-MCNC: 1.2 MG/DL (ref 0–1.2)
BUN SERPL-MCNC: 53 MG/DL (ref 6–23)
CALCIUM SERPL-MCNC: 9.4 MG/DL (ref 8.6–10.6)
CHLORIDE SERPL-SCNC: 98 MMOL/L (ref 98–107)
CO2 SERPL-SCNC: 23 MMOL/L (ref 21–32)
CREAT SERPL-MCNC: 1.75 MG/DL (ref 0.5–1.3)
GFR SERPL CREATININE-BSD FRML MDRD: 41 ML/MIN/1.73M*2
GLUCOSE SERPL-MCNC: 233 MG/DL (ref 74–99)
MAGNESIUM SERPL-MCNC: 2.01 MG/DL (ref 1.6–2.4)
POTASSIUM SERPL-SCNC: 4.3 MMOL/L (ref 3.5–5.3)
PROT SERPL-MCNC: 6.4 G/DL (ref 6.4–8.2)
SODIUM SERPL-SCNC: 133 MMOL/L (ref 136–145)

## 2023-10-16 ENCOUNTER — INFUSION (OUTPATIENT)
Dept: HEMATOLOGY/ONCOLOGY | Facility: CLINIC | Age: 73
End: 2023-10-16
Payer: MEDICARE

## 2023-10-16 VITALS
DIASTOLIC BLOOD PRESSURE: 66 MMHG | WEIGHT: 216.93 LBS | TEMPERATURE: 97 F | SYSTOLIC BLOOD PRESSURE: 108 MMHG | OXYGEN SATURATION: 98 % | BODY MASS INDEX: 37.13 KG/M2 | HEART RATE: 73 BPM | RESPIRATION RATE: 18 BRPM

## 2023-10-16 DIAGNOSIS — C67.9 MALIGNANT NEOPLASM OF URINARY BLADDER, UNSPECIFIED SITE (MULTI): ICD-10-CM

## 2023-10-16 PROCEDURE — 96361 HYDRATE IV INFUSION ADD-ON: CPT

## 2023-10-16 PROCEDURE — 96413 CHEMO IV INFUSION 1 HR: CPT

## 2023-10-16 PROCEDURE — 96417 CHEMO IV INFUS EACH ADDL SEQ: CPT

## 2023-10-16 PROCEDURE — 96365 THER/PROPH/DIAG IV INF INIT: CPT | Mod: INF

## 2023-10-16 PROCEDURE — 96368 THER/DIAG CONCURRENT INF: CPT

## 2023-10-16 PROCEDURE — 96367 TX/PROPH/DG ADDL SEQ IV INF: CPT | Mod: INF

## 2023-10-16 PROCEDURE — 2500000004 HC RX 250 GENERAL PHARMACY W/ HCPCS (ALT 636 FOR OP/ED): Mod: JZ | Performed by: INTERNAL MEDICINE

## 2023-10-16 PROCEDURE — 2500000004 HC RX 250 GENERAL PHARMACY W/ HCPCS (ALT 636 FOR OP/ED): Performed by: INTERNAL MEDICINE

## 2023-10-16 PROCEDURE — 96366 THER/PROPH/DIAG IV INF ADDON: CPT

## 2023-10-16 PROCEDURE — 96375 TX/PRO/DX INJ NEW DRUG ADDON: CPT

## 2023-10-16 PROCEDURE — 2500000001 HC RX 250 WO HCPCS SELF ADMINISTERED DRUGS (ALT 637 FOR MEDICARE OP): Performed by: INTERNAL MEDICINE

## 2023-10-16 RX ORDER — FAMOTIDINE 10 MG/ML
20 INJECTION INTRAVENOUS ONCE
Status: COMPLETED | OUTPATIENT
Start: 2023-10-16 | End: 2023-10-16

## 2023-10-16 RX ORDER — EPINEPHRINE 0.3 MG/.3ML
0.3 INJECTION SUBCUTANEOUS EVERY 5 MIN PRN
Status: DISCONTINUED | OUTPATIENT
Start: 2023-10-16 | End: 2023-10-16 | Stop reason: HOSPADM

## 2023-10-16 RX ORDER — FAMOTIDINE 10 MG/ML
20 INJECTION INTRAVENOUS ONCE AS NEEDED
Status: DISCONTINUED | OUTPATIENT
Start: 2023-10-16 | End: 2023-10-16 | Stop reason: HOSPADM

## 2023-10-16 RX ORDER — PALONOSETRON 0.05 MG/ML
0.25 INJECTION, SOLUTION INTRAVENOUS ONCE
Status: COMPLETED | OUTPATIENT
Start: 2023-10-16 | End: 2023-10-16

## 2023-10-16 RX ORDER — DIPHENHYDRAMINE HCL 50 MG
50 CAPSULE ORAL ONCE
Status: COMPLETED | OUTPATIENT
Start: 2023-10-16 | End: 2023-10-16

## 2023-10-16 RX ORDER — HEPARIN SODIUM,PORCINE/PF 10 UNIT/ML
50 SYRINGE (ML) INTRAVENOUS AS NEEDED
Status: CANCELLED | OUTPATIENT
Start: 2023-10-16

## 2023-10-16 RX ORDER — PROCHLORPERAZINE MALEATE 10 MG
10 TABLET ORAL EVERY 6 HOURS PRN
Status: DISCONTINUED | OUTPATIENT
Start: 2023-10-16 | End: 2023-10-16 | Stop reason: HOSPADM

## 2023-10-16 RX ORDER — HEPARIN 100 UNIT/ML
500 SYRINGE INTRAVENOUS AS NEEDED
Status: CANCELLED | OUTPATIENT
Start: 2023-10-16

## 2023-10-16 RX ORDER — DIPHENHYDRAMINE HYDROCHLORIDE 50 MG/ML
50 INJECTION INTRAMUSCULAR; INTRAVENOUS AS NEEDED
Status: DISCONTINUED | OUTPATIENT
Start: 2023-10-16 | End: 2023-10-16 | Stop reason: HOSPADM

## 2023-10-16 RX ORDER — ALBUTEROL SULFATE 0.83 MG/ML
3 SOLUTION RESPIRATORY (INHALATION) AS NEEDED
Status: DISCONTINUED | OUTPATIENT
Start: 2023-10-16 | End: 2023-10-16 | Stop reason: HOSPADM

## 2023-10-16 RX ORDER — PROCHLORPERAZINE EDISYLATE 5 MG/ML
10 INJECTION INTRAMUSCULAR; INTRAVENOUS EVERY 6 HOURS PRN
Status: DISCONTINUED | OUTPATIENT
Start: 2023-10-16 | End: 2023-10-16 | Stop reason: HOSPADM

## 2023-10-16 RX ADMIN — PALONOSETRON 250 MCG: 0.05 INJECTION, SOLUTION INTRAVENOUS at 09:59

## 2023-10-16 RX ADMIN — CISPLATIN 42 MG: 50 INJECTION, SOLUTION INTRAVENOUS at 11:21

## 2023-10-16 RX ADMIN — DIPHENHYDRAMINE HYDROCHLORIDE 50 MG: 50 CAPSULE ORAL at 10:22

## 2023-10-16 RX ADMIN — DEXAMETHASONE SODIUM PHOSPHATE 20 MG: 4 INJECTION, SOLUTION INTRA-ARTICULAR; INTRALESIONAL; INTRAMUSCULAR; INTRAVENOUS; SOFT TISSUE at 10:56

## 2023-10-16 RX ADMIN — FAMOTIDINE 20 MG: 10 INJECTION, SOLUTION INTRAVENOUS at 09:59

## 2023-10-16 RX ADMIN — POTASSIUM CHLORIDE 500 ML/HR: 2 INJECTION, SOLUTION, CONCENTRATE INTRAVENOUS at 09:53

## 2023-10-16 RX ADMIN — POTASSIUM CHLORIDE 500 ML/HR: 2 INJECTION, SOLUTION, CONCENTRATE INTRAVENOUS at 12:36

## 2023-10-16 RX ADMIN — PACLITAXEL 105 MG: 6 INJECTION, SOLUTION INTRAVENOUS at 12:35

## 2023-10-16 ASSESSMENT — PAIN SCALES - GENERAL: PAINLEVEL: 0-NO PAIN

## 2023-10-16 NOTE — SIGNIFICANT EVENT
10/16/23 0931   Prechemo Checklist   Has the patient been in the hospital, ED, or urgent care since last date of service No   Chemo/Immuno Consent Signed Yes   Protocol/Indications Verified Yes   Confirmed to previous date/time of medication Yes   Compared to previous dose Yes   All medications are dated accurately Yes   Pregnancy Test Negative Not applicable   Parameters Met Yes   BSA/Weight-Height Verified Yes   Dose Calculations Verified Yes

## 2023-10-16 NOTE — PROGRESS NOTES
Treatment completed. Patient tolerated well. Remained asymptomatic throughout. Discharged home ambulatory with spouse offering no complaints.

## 2023-10-17 ENCOUNTER — DOCUMENTATION (OUTPATIENT)
Dept: HEMATOLOGY/ONCOLOGY | Facility: CLINIC | Age: 73
End: 2023-10-17

## 2023-10-17 ENCOUNTER — OFFICE VISIT (OUTPATIENT)
Dept: HEMATOLOGY/ONCOLOGY | Facility: CLINIC | Age: 73
End: 2023-10-17
Payer: MEDICARE

## 2023-10-17 VITALS
RESPIRATION RATE: 18 BRPM | BODY MASS INDEX: 37.19 KG/M2 | OXYGEN SATURATION: 97 % | WEIGHT: 217.81 LBS | TEMPERATURE: 98.2 F | HEIGHT: 64 IN | HEART RATE: 83 BPM | DIASTOLIC BLOOD PRESSURE: 65 MMHG | SYSTOLIC BLOOD PRESSURE: 127 MMHG

## 2023-10-17 DIAGNOSIS — C67.3 MALIGNANT NEOPLASM OF ANTERIOR WALL OF URINARY BLADDER (MULTI): Primary | ICD-10-CM

## 2023-10-17 DIAGNOSIS — C67.9 MALIGNANT NEOPLASM OF URINARY BLADDER, UNSPECIFIED SITE (MULTI): ICD-10-CM

## 2023-10-17 PROCEDURE — 99215 OFFICE O/P EST HI 40 MIN: CPT | Performed by: INTERNAL MEDICINE

## 2023-10-17 PROCEDURE — 3074F SYST BP LT 130 MM HG: CPT | Performed by: INTERNAL MEDICINE

## 2023-10-17 PROCEDURE — 1126F AMNT PAIN NOTED NONE PRSNT: CPT | Performed by: INTERNAL MEDICINE

## 2023-10-17 PROCEDURE — 1159F MED LIST DOCD IN RCRD: CPT | Performed by: INTERNAL MEDICINE

## 2023-10-17 PROCEDURE — 3078F DIAST BP <80 MM HG: CPT | Performed by: INTERNAL MEDICINE

## 2023-10-17 ASSESSMENT — PAIN SCALES - GENERAL: PAINLEVEL: 0-NO PAIN

## 2023-10-17 NOTE — PATIENT INSTRUCTIONS
Patient started radiation therapy on October 9, 2023  Patient to get week #3 of cisplatin Taxol on October 23, 2023     Return for follow-up in 3 weeks  CBC, CMP, magnesium

## 2023-10-17 NOTE — PROGRESS NOTES
ASSESSMENT, PROBLEM LIST, DECISION MAKING, PLAN.    Muscle invasive bladder carcinoma diagnosed in August 2023, staging work-up revealed 8 mm left iliac chain lymph node concerning for metastatic lymph node in addition to lobular enhancing urinary bladder mass along the left lateral and posterior wall and 6 mm pulmonary nodule in superior segment of right lower lobe 3 mm pulmonary nodule in right lower lobe.  PET scan is currently pending  Patient has declined surgery and is interested in bladder preservation and has been seen by Dr. Negro    InitialHistory of bladder cancer initially superficial diagnosed sometime in 2019 and was treated with TURBT and later received intravesicular BCG x12 treatment which was discontinued in 2022.          PAST MEDICAL HISTORY:       gouty arthritis, hearing difficulty, coronary artery disease status post stent and has been followed by Dr. Armstrong, hypertension,        INTERVAL HISTORY :     Patient is here today for follow-up patient has started taking Chemoradiation from October 9, 2023 under care of Dr. Negro, he seems to be tolerating without any major problem.    So far he seems to be tolerating treatment well, he did have off-and-on hematuria and large blood clot came out earlier this week but he is otherwise doing well.      PHYSICAL EXAM:  General: Conscious, alert, oriented x3, not in acute distress.  HEENT:    No icterus.    Chest:Bilateral symmetrical,     CVS:  S1, S2.    Abdomen:  Soft, no palpable mass   Extremities: No clubbing, cyanosis,     Skin: No petechial rash.        ASSESSMENT & PLAN:    Muscle invasive bladder carcinoma diagnosed in August 2023, staging work-up revealed 8 mm left iliac chain lymph node concerning for metastatic lymph node in addition to lobular enhancing urinary bladder mass along the left lateral and posterior wall and 6 mm pulmonary nodule in superior segment of right lower lobe 3 mm pulmonary nodule in right lower lobe.  PET scan  showed no pulmonary nodule activity, there was FDG avid left iliac chain lymph node highly suspicious for metastatic disease  I have discussed with Dr. Negro in the past and left iliac lymph node would be in the radiation field     Patient was started on bladder preservation concomitant chemoradiation using cisplatin and Taxol   Week #1 was on October 9, 2023  Will receive week #3 on October 23, 2023  I explained to the patient that in next 2 to 3-week he will have increasing side effect possibly from radiation, advised to drink plenty of fluids, he had a worsening renal function, renal ultrasound has been ordered      Risk benefit and side effect of pharmacological treatment for malignancy  including possibility of life-threatening side effects were discussed.  Continue intensive monitoring for toxicity and agreed to be compliant for follow-up.  We will also require close monitoring for cytopenia, electrolyte imbalance, liver and renal function and/or imaging.      Discussed with patient and wife      Medication reviewed in e-chart  Patient is monitored for medication toxicity  labs reviewed and interpreted independently, X rays independently reviewed  Notes from other physicians involved in care were reviewed      Charting was completed using voice recognition technology and may include unintended errors.      TEX KINCAID MD, OLIVIA.    Tyrone Shane Master Clinician in Hematology and Oncology  Medical Director, [facility] at [facility].    [place] office    Phone [phone]  Fax      [phone]    [facility] /[place].    Phone [phone]  Fax     [phone]

## 2023-10-17 NOTE — PROGRESS NOTES
Pt seen by Dr. Jarquin in clinic today.  To continue with weekly Cisplatin/Taxol.  FUV with Dr. Jarquin in 3 weeks scheduled 11/7/23.  Infusion appts adjusted to be weekly on Monday's with concurrent radiation.  Pt and wife verbalize they do not wish to view appts on My Chart and instructed to  appts with scheduling.  Patient verbalizes understanding of plan of care via teachback method.

## 2023-10-20 ENCOUNTER — LAB (OUTPATIENT)
Dept: LAB | Facility: CLINIC | Age: 73
End: 2023-10-20
Payer: MEDICARE

## 2023-10-20 DIAGNOSIS — C67.3 MALIGNANT NEOPLASM OF ANTERIOR WALL OF URINARY BLADDER (MULTI): Primary | ICD-10-CM

## 2023-10-20 LAB
BASOPHILS # BLD AUTO: 0.02 X10*3/UL (ref 0–0.1)
BASOPHILS NFR BLD AUTO: 0.4 %
EOSINOPHIL # BLD AUTO: 0.11 X10*3/UL (ref 0–0.4)
EOSINOPHIL NFR BLD AUTO: 2.2 %
ERYTHROCYTE [DISTWIDTH] IN BLOOD BY AUTOMATED COUNT: 14 % (ref 11.5–14.5)
HCT VFR BLD AUTO: 37 % (ref 41–52)
HGB BLD-MCNC: 12.3 G/DL (ref 13.5–17.5)
IMM GRANULOCYTES # BLD AUTO: 0.02 X10*3/UL (ref 0–0.5)
IMM GRANULOCYTES NFR BLD AUTO: 0.4 % (ref 0–0.9)
LYMPHOCYTES # BLD AUTO: 0.88 X10*3/UL (ref 0.8–3)
LYMPHOCYTES NFR BLD AUTO: 18 %
MCH RBC QN AUTO: 30.8 PG (ref 26–34)
MCHC RBC AUTO-ENTMCNC: 33.2 G/DL (ref 32–36)
MCV RBC AUTO: 93 FL (ref 80–100)
MONOCYTES # BLD AUTO: 0.38 X10*3/UL (ref 0.05–0.8)
MONOCYTES NFR BLD AUTO: 7.8 %
NEUTROPHILS # BLD AUTO: 3.48 X10*3/UL (ref 1.6–5.5)
NEUTROPHILS NFR BLD AUTO: 71.2 %
NRBC BLD-RTO: ABNORMAL /100{WBCS}
PLATELET # BLD AUTO: 193 X10*3/UL (ref 150–450)
PMV BLD AUTO: 10.1 FL (ref 7.5–11.5)
RBC # BLD AUTO: 4 X10*6/UL (ref 4.5–5.9)
WBC # BLD AUTO: 4.9 X10*3/UL (ref 4.4–11.3)

## 2023-10-20 PROCEDURE — 80053 COMPREHEN METABOLIC PANEL: CPT | Mod: CMCLAB | Performed by: INTERNAL MEDICINE

## 2023-10-20 PROCEDURE — 36415 COLL VENOUS BLD VENIPUNCTURE: CPT

## 2023-10-20 PROCEDURE — 83735 ASSAY OF MAGNESIUM: CPT | Performed by: INTERNAL MEDICINE

## 2023-10-20 PROCEDURE — 85025 COMPLETE CBC W/AUTO DIFF WBC: CPT

## 2023-10-20 RX ORDER — DIPHENHYDRAMINE HCL 50 MG
50 CAPSULE ORAL ONCE
Status: CANCELLED
Start: 2023-10-23 | End: 2023-10-23

## 2023-10-20 RX ORDER — DIPHENHYDRAMINE HCL 50 MG
50 CAPSULE ORAL ONCE
Status: CANCELLED
Start: 2023-11-20 | End: 2023-11-20

## 2023-10-20 RX ORDER — DIPHENHYDRAMINE HCL 50 MG
50 CAPSULE ORAL ONCE
Status: CANCELLED
Start: 2023-11-13 | End: 2023-11-13

## 2023-10-20 RX ORDER — DIPHENHYDRAMINE HCL 50 MG
50 CAPSULE ORAL ONCE
Status: CANCELLED
Start: 2023-11-06 | End: 2023-11-06

## 2023-10-20 RX ORDER — DIPHENHYDRAMINE HCL 50 MG
50 CAPSULE ORAL ONCE
Status: CANCELLED
Start: 2023-10-30 | End: 2023-10-30

## 2023-10-21 LAB
ALBUMIN SERPL BCP-MCNC: 4 G/DL (ref 3.4–5)
ALP SERPL-CCNC: 90 U/L (ref 33–136)
ALT SERPL W P-5'-P-CCNC: 34 U/L (ref 10–52)
ANION GAP SERPL CALC-SCNC: 15 MMOL/L (ref 10–20)
AST SERPL W P-5'-P-CCNC: 21 U/L (ref 9–39)
BILIRUB SERPL-MCNC: 0.7 MG/DL (ref 0–1.2)
BUN SERPL-MCNC: 44 MG/DL (ref 6–23)
CALCIUM SERPL-MCNC: 9.5 MG/DL (ref 8.6–10.6)
CHLORIDE SERPL-SCNC: 105 MMOL/L (ref 98–107)
CO2 SERPL-SCNC: 23 MMOL/L (ref 21–32)
CREAT SERPL-MCNC: 1.43 MG/DL (ref 0.5–1.3)
GFR SERPL CREATININE-BSD FRML MDRD: 52 ML/MIN/1.73M*2
GLUCOSE SERPL-MCNC: 112 MG/DL (ref 74–99)
MAGNESIUM SERPL-MCNC: 1.58 MG/DL (ref 1.6–2.4)
POTASSIUM SERPL-SCNC: 4.9 MMOL/L (ref 3.5–5.3)
PROT SERPL-MCNC: 6.3 G/DL (ref 6.4–8.2)
SODIUM SERPL-SCNC: 138 MMOL/L (ref 136–145)

## 2023-10-23 ENCOUNTER — SOCIAL WORK (OUTPATIENT)
Dept: HEMATOLOGY/ONCOLOGY | Facility: CLINIC | Age: 73
End: 2023-10-23

## 2023-10-23 ENCOUNTER — NUTRITION (OUTPATIENT)
Dept: HEMATOLOGY/ONCOLOGY | Facility: CLINIC | Age: 73
End: 2023-10-23

## 2023-10-23 ENCOUNTER — INFUSION (OUTPATIENT)
Dept: HEMATOLOGY/ONCOLOGY | Facility: CLINIC | Age: 73
End: 2023-10-23
Payer: MEDICARE

## 2023-10-23 VITALS
DIASTOLIC BLOOD PRESSURE: 76 MMHG | HEART RATE: 63 BPM | BODY MASS INDEX: 36.7 KG/M2 | HEIGHT: 64 IN | OXYGEN SATURATION: 99 % | SYSTOLIC BLOOD PRESSURE: 126 MMHG | WEIGHT: 214.95 LBS | RESPIRATION RATE: 18 BRPM | TEMPERATURE: 97 F

## 2023-10-23 DIAGNOSIS — C67.9 MALIGNANT NEOPLASM OF URINARY BLADDER, UNSPECIFIED SITE (MULTI): ICD-10-CM

## 2023-10-23 PROCEDURE — 96376 TX/PRO/DX INJ SAME DRUG ADON: CPT

## 2023-10-23 PROCEDURE — 96375 TX/PRO/DX INJ NEW DRUG ADDON: CPT | Mod: INF

## 2023-10-23 PROCEDURE — 96413 CHEMO IV INFUSION 1 HR: CPT

## 2023-10-23 PROCEDURE — 2500000004 HC RX 250 GENERAL PHARMACY W/ HCPCS (ALT 636 FOR OP/ED): Performed by: INTERNAL MEDICINE

## 2023-10-23 PROCEDURE — 2500000001 HC RX 250 WO HCPCS SELF ADMINISTERED DRUGS (ALT 637 FOR MEDICARE OP): Performed by: INTERNAL MEDICINE

## 2023-10-23 PROCEDURE — 2500000004 HC RX 250 GENERAL PHARMACY W/ HCPCS (ALT 636 FOR OP/ED): Mod: JZ | Performed by: INTERNAL MEDICINE

## 2023-10-23 PROCEDURE — 96411 CHEMO IV PUSH ADDL DRUG: CPT

## 2023-10-23 PROCEDURE — 96367 TX/PROPH/DG ADDL SEQ IV INF: CPT

## 2023-10-23 RX ORDER — EPINEPHRINE 0.3 MG/.3ML
0.3 INJECTION SUBCUTANEOUS EVERY 5 MIN PRN
Status: DISCONTINUED | OUTPATIENT
Start: 2023-10-23 | End: 2023-10-23 | Stop reason: HOSPADM

## 2023-10-23 RX ORDER — PROCHLORPERAZINE MALEATE 10 MG
10 TABLET ORAL EVERY 6 HOURS PRN
Status: DISCONTINUED | OUTPATIENT
Start: 2023-10-23 | End: 2023-10-23 | Stop reason: HOSPADM

## 2023-10-23 RX ORDER — FAMOTIDINE 10 MG/ML
20 INJECTION INTRAVENOUS ONCE
Status: COMPLETED | OUTPATIENT
Start: 2023-10-23 | End: 2023-10-23

## 2023-10-23 RX ORDER — ALBUTEROL SULFATE 0.83 MG/ML
3 SOLUTION RESPIRATORY (INHALATION) AS NEEDED
Status: DISCONTINUED | OUTPATIENT
Start: 2023-10-23 | End: 2023-10-23 | Stop reason: HOSPADM

## 2023-10-23 RX ORDER — HEPARIN SODIUM,PORCINE/PF 10 UNIT/ML
50 SYRINGE (ML) INTRAVENOUS AS NEEDED
Status: CANCELLED | OUTPATIENT
Start: 2023-10-23

## 2023-10-23 RX ORDER — DIPHENHYDRAMINE HYDROCHLORIDE 50 MG/ML
50 INJECTION INTRAMUSCULAR; INTRAVENOUS AS NEEDED
Status: DISCONTINUED | OUTPATIENT
Start: 2023-10-23 | End: 2023-10-23 | Stop reason: HOSPADM

## 2023-10-23 RX ORDER — HEPARIN 100 UNIT/ML
500 SYRINGE INTRAVENOUS AS NEEDED
Status: CANCELLED | OUTPATIENT
Start: 2023-10-23

## 2023-10-23 RX ORDER — PALONOSETRON 0.05 MG/ML
0.25 INJECTION, SOLUTION INTRAVENOUS ONCE
Status: COMPLETED | OUTPATIENT
Start: 2023-10-23 | End: 2023-10-23

## 2023-10-23 RX ORDER — PROCHLORPERAZINE EDISYLATE 5 MG/ML
10 INJECTION INTRAMUSCULAR; INTRAVENOUS EVERY 6 HOURS PRN
Status: DISCONTINUED | OUTPATIENT
Start: 2023-10-23 | End: 2023-10-23 | Stop reason: HOSPADM

## 2023-10-23 RX ORDER — DIPHENHYDRAMINE HCL 50 MG
50 CAPSULE ORAL ONCE
Status: COMPLETED | OUTPATIENT
Start: 2023-10-23 | End: 2023-10-23

## 2023-10-23 RX ORDER — FAMOTIDINE 10 MG/ML
20 INJECTION INTRAVENOUS ONCE AS NEEDED
Status: DISCONTINUED | OUTPATIENT
Start: 2023-10-23 | End: 2023-10-23 | Stop reason: HOSPADM

## 2023-10-23 RX ADMIN — POTASSIUM CHLORIDE 500 ML/HR: 2 INJECTION, SOLUTION, CONCENTRATE INTRAVENOUS at 13:05

## 2023-10-23 RX ADMIN — FAMOTIDINE 20 MG: 10 INJECTION, SOLUTION INTRAVENOUS at 09:51

## 2023-10-23 RX ADMIN — SODIUM CHLORIDE 42 MG: 9 INJECTION, SOLUTION INTRAVENOUS at 11:59

## 2023-10-23 RX ADMIN — FOSAPREPITANT 150 MG: 150 INJECTION, POWDER, LYOPHILIZED, FOR SOLUTION INTRAVENOUS at 11:29

## 2023-10-23 RX ADMIN — PACLITAXEL 105 MG: 6 INJECTION, SOLUTION INTRAVENOUS at 13:10

## 2023-10-23 RX ADMIN — PALONOSETRON 250 MCG: 0.05 INJECTION, SOLUTION INTRAVENOUS at 09:52

## 2023-10-23 RX ADMIN — DIPHENHYDRAMINE HYDROCHLORIDE 50 MG: 50 CAPSULE ORAL at 09:53

## 2023-10-23 RX ADMIN — DEXAMETHASONE SODIUM PHOSPHATE 20 MG: 4 INJECTION, SOLUTION INTRA-ARTICULAR; INTRALESIONAL; INTRAMUSCULAR; INTRAVENOUS; SOFT TISSUE at 11:03

## 2023-10-23 RX ADMIN — POTASSIUM CHLORIDE 500 ML/HR: 2 INJECTION, SOLUTION, CONCENTRATE INTRAVENOUS at 10:34

## 2023-10-23 ASSESSMENT — PAIN SCALES - GENERAL: PAINLEVEL: 2

## 2023-10-23 NOTE — PROGRESS NOTES
Patient presented today for treatment.  JUAN reviewed chart and had not previously met with him.  He is a 73 y/o gentleman under the care of Dr. Jarquin for a dx of bladder cancer who is undergoing chemotherapy and radiation.  Checked in with him today to introduce SW, assess needs, and offer support.  He was accompanied by his wife, Violetta.  JUAN introduced self and provided a brief narrative of the SW role in this setting.  Patient did not appear to wish to engage in discussion, shared that he has no needs at this time.  JUAN provided contact info and encouraged them to feel free to reach out anytime and that SW may revisit periodically.

## 2023-10-23 NOTE — PROGRESS NOTES
NUTRITION Assessment NOTE    Reason for Visit:  Keenan Reveles is a 72 y.o. 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   -started on bladder preservation concomitant chemoradiation using cisplatin and Taxol   Week #1 was on October 9, 2023    As patient receiving combined treatment modality, met with patient per screening to assess nutritional needs and introduce nutrition services.     Lab Results   Component Value Date/Time    GLUCOSE 112 (H) 10/20/2023 1100     10/20/2023 1100    K 4.9 10/20/2023 1100     10/20/2023 1100    CO2 23 10/20/2023 1100    ANIONGAP 15 10/20/2023 1100    BUN 44 (H) 10/20/2023 1100    CREATININE 1.43 (H) 10/20/2023 1100    EGFR 52 (L) 10/20/2023 1100    CALCIUM 9.5 10/20/2023 1100    ALBUMIN 4.0 10/20/2023 1100    ALKPHOS 90 10/20/2023 1100    PROT 6.3 (L) 10/20/2023 1100    AST 21 10/20/2023 1100    BILITOT 0.7 10/20/2023 1100    ALT 34 10/20/2023 1100     Lab Results   Component Value Date/Time    VITD25 45 05/01/2020 0910       Anthropometrics:  Anthropometrics  Height:  (162.8 cm)  IBW/kg (Dietitian Calculated):  (59)  Percent of IBW:  (165%)  Adjusted Body Weight (kg):  (68.6 kg)  Weight Change  Weight History / % Weight Change: 3% x 1 month  Significant Weight Loss: No        Wt Readings from Last 10 Encounters:   10/23/23 97.5 kg (214 lb 15.2 oz)   10/17/23 98.8 kg (217 lb 13 oz)   10/16/23 98.4 kg (216 lb 14.9 oz)   10/09/23 99.5 kg (219 lb 5.7 oz)   09/25/23 101 kg (223 lb 5.2 oz)   07/08/20 105 kg (232 lb)   05/01/20 105 kg (231 lb)        Food And Nutrient Intake:  Food and Nutrient History  Food and Nutrient History: Patient reports fair-good appetite and oral intake.  Denies any current nutrition issues.  Does state weight is down, as he has intentionally reduced intake. With treatment, feels he does best with smaller more frequent meals/snacks.  Energy Intake: Good > 75 %  GI Symptoms: none      Nutrition Focused Physical  Exam:  Subcutaneous Fat Loss  Orbital Fat Pads: Well nourshed (slightly bulging fat pads)  Muscle Wasting  Temporalis: Well nourished (well-defined muscle)  Edema  Edema: none        Energy Needs  Calculated Energy Needs Using Equations  Height:  (162.8 cm)  Estimated Fluid Needs  Total Fluid Estimated Needs (mL):  (2050 mls)  Estimated Protein Needs  Total Protein Estimated Needs (g):  (82-95 g)        Diagnosis   Malnutrition Diagnosis  Patient has Malnutrition Diagnosis: No  Nutrition Diagnosis  Patient has Nutrition Diagnosis: Yes  Nutrition Diagnosis 1: Increased nutrient needs  Related to (1): disease process and combined treatment modality  As Evidenced by (1): increased metabolic demands to prevent catabolism    Interventions/Recommendations   Food and Nutrition Delivery  Meals & Snacks: Protein-modified diet  Goals: Encouraged high protein food source at each meal/snack (as patient remains with elevated protein requirements)  -Discussed that this service is available should any specific nutrition issues arise and provided him with my business card.    -Reviewed that we do have samples of oral nutrition supplement shakes, if intake becomes sub-optimal, as well as coupons to assist with cost savings.     There are no Patient Instructions on file for this visit.    Monitoring and Evaluation   Food/Nutrient Related History Monitoring  Monitoring and Evaluation Plan: Meal/snack pattern (Continue with small frequent meals/snacks)      Time Spent  Prep time on day of patient encounter: 10 minutes  Time spent directly with patient, family or caregiver: 5 minutes  Additional Time Spent on Patient Care Activities: 0 minutes  Documentation Time: 15 minutes  Other Time Spent: 0 minutes  Total: 30 minutes          Level of Understanding : Good

## 2023-10-23 NOTE — SIGNIFICANT EVENT
10/23/23 0925   Prechemo Checklist   Has the patient been in the hospital, ED, or urgent care since last date of service No   Chemo/Immuno Consent Signed Yes   Protocol/Indications Verified Yes   Confirmed to previous date/time of medication Yes   Compared to previous dose Yes   All medications are dated accurately Yes   Pregnancy Test Negative Not applicable   Parameters Met Yes   BSA/Weight-Height Verified Yes   Dose Calculations Verified Yes

## 2023-10-25 ENCOUNTER — TELEPHONE (OUTPATIENT)
Dept: HEMATOLOGY/ONCOLOGY | Facility: CLINIC | Age: 73
End: 2023-10-25
Payer: MEDICARE

## 2023-10-25 NOTE — TELEPHONE ENCOUNTER
MD Elida Crystal MA  Cc: Deann Dubois RN  I am ordering renal USG, inform that renal fun is getting worse, drink lots of fluid     Call pl aced to patient.  Spoke with wife.  Wife is aware and states they are scheduled tomorrow at [facility] at 7:15AM.  Call back instructions reviewed.  Patient wife verbalized understanding.

## 2023-10-27 ENCOUNTER — LAB (OUTPATIENT)
Dept: LAB | Facility: CLINIC | Age: 73
End: 2023-10-27
Payer: MEDICARE

## 2023-10-27 DIAGNOSIS — C67.3 MALIGNANT NEOPLASM OF ANTERIOR WALL OF URINARY BLADDER (MULTI): ICD-10-CM

## 2023-10-27 DIAGNOSIS — C67.9 MALIGNANT NEOPLASM OF URINARY BLADDER, UNSPECIFIED SITE (MULTI): ICD-10-CM

## 2023-10-27 LAB
BASOPHILS # BLD AUTO: 0.02 X10*3/UL (ref 0–0.1)
BASOPHILS NFR BLD AUTO: 0.5 %
EOSINOPHIL # BLD AUTO: 0.15 X10*3/UL (ref 0–0.4)
EOSINOPHIL NFR BLD AUTO: 3.5 %
ERYTHROCYTE [DISTWIDTH] IN BLOOD BY AUTOMATED COUNT: 13.9 % (ref 11.5–14.5)
HCT VFR BLD AUTO: 32.9 % (ref 41–52)
HGB BLD-MCNC: 11.2 G/DL (ref 13.5–17.5)
IMM GRANULOCYTES # BLD AUTO: 0.02 X10*3/UL (ref 0–0.5)
IMM GRANULOCYTES NFR BLD AUTO: 0.5 % (ref 0–0.9)
LYMPHOCYTES # BLD AUTO: 0.64 X10*3/UL (ref 0.8–3)
LYMPHOCYTES NFR BLD AUTO: 14.9 %
MCH RBC QN AUTO: 30.9 PG (ref 26–34)
MCHC RBC AUTO-ENTMCNC: 34 G/DL (ref 32–36)
MCV RBC AUTO: 91 FL (ref 80–100)
MONOCYTES # BLD AUTO: 0.25 X10*3/UL (ref 0.05–0.8)
MONOCYTES NFR BLD AUTO: 5.8 %
NEUTROPHILS # BLD AUTO: 3.22 X10*3/UL (ref 1.6–5.5)
NEUTROPHILS NFR BLD AUTO: 74.8 %
NRBC BLD-RTO: ABNORMAL /100{WBCS}
PLATELET # BLD AUTO: 163 X10*3/UL (ref 150–450)
PMV BLD AUTO: 9.9 FL (ref 7.5–11.5)
RBC # BLD AUTO: 3.62 X10*6/UL (ref 4.5–5.9)
WBC # BLD AUTO: 4.3 X10*3/UL (ref 4.4–11.3)

## 2023-10-27 PROCEDURE — 85025 COMPLETE CBC W/AUTO DIFF WBC: CPT

## 2023-10-27 PROCEDURE — 83735 ASSAY OF MAGNESIUM: CPT

## 2023-10-27 PROCEDURE — 80053 COMPREHEN METABOLIC PANEL: CPT

## 2023-10-27 PROCEDURE — 36415 COLL VENOUS BLD VENIPUNCTURE: CPT

## 2023-10-28 LAB
ALBUMIN SERPL BCP-MCNC: 3.7 G/DL (ref 3.4–5)
ALP SERPL-CCNC: 94 U/L (ref 33–136)
ALT SERPL W P-5'-P-CCNC: 21 U/L (ref 10–52)
ANION GAP SERPL CALC-SCNC: 13 MMOL/L (ref 10–20)
AST SERPL W P-5'-P-CCNC: 14 U/L (ref 9–39)
BILIRUB SERPL-MCNC: 0.5 MG/DL (ref 0–1.2)
BUN SERPL-MCNC: 35 MG/DL (ref 6–23)
CALCIUM SERPL-MCNC: 8.7 MG/DL (ref 8.6–10.6)
CHLORIDE SERPL-SCNC: 102 MMOL/L (ref 98–107)
CO2 SERPL-SCNC: 24 MMOL/L (ref 21–32)
CREAT SERPL-MCNC: 1.38 MG/DL (ref 0.5–1.3)
GFR SERPL CREATININE-BSD FRML MDRD: 54 ML/MIN/1.73M*2
GLUCOSE SERPL-MCNC: 242 MG/DL (ref 74–99)
MAGNESIUM SERPL-MCNC: 1.37 MG/DL (ref 1.6–2.4)
MAGNESIUM SERPL-MCNC: 1.39 MG/DL (ref 1.6–2.4)
POTASSIUM SERPL-SCNC: 4.6 MMOL/L (ref 3.5–5.3)
PROT SERPL-MCNC: 5.8 G/DL (ref 6.4–8.2)
SODIUM SERPL-SCNC: 134 MMOL/L (ref 136–145)

## 2023-10-30 ENCOUNTER — INFUSION (OUTPATIENT)
Dept: HEMATOLOGY/ONCOLOGY | Facility: CLINIC | Age: 73
End: 2023-10-30
Payer: MEDICARE

## 2023-10-30 VITALS
DIASTOLIC BLOOD PRESSURE: 59 MMHG | OXYGEN SATURATION: 98 % | WEIGHT: 213.96 LBS | TEMPERATURE: 96.1 F | HEIGHT: 64 IN | BODY MASS INDEX: 36.53 KG/M2 | HEART RATE: 77 BPM | SYSTOLIC BLOOD PRESSURE: 114 MMHG | RESPIRATION RATE: 16 BRPM

## 2023-10-30 DIAGNOSIS — E83.42 HYPOMAGNESEMIA: Primary | ICD-10-CM

## 2023-10-30 DIAGNOSIS — C67.9 MALIGNANT NEOPLASM OF URINARY BLADDER, UNSPECIFIED SITE (MULTI): ICD-10-CM

## 2023-10-30 PROCEDURE — 96413 CHEMO IV INFUSION 1 HR: CPT

## 2023-10-30 PROCEDURE — 96367 TX/PROPH/DG ADDL SEQ IV INF: CPT

## 2023-10-30 PROCEDURE — 96411 CHEMO IV PUSH ADDL DRUG: CPT

## 2023-10-30 PROCEDURE — 96368 THER/DIAG CONCURRENT INF: CPT

## 2023-10-30 PROCEDURE — 96417 CHEMO IV INFUS EACH ADDL SEQ: CPT

## 2023-10-30 PROCEDURE — 2500000004 HC RX 250 GENERAL PHARMACY W/ HCPCS (ALT 636 FOR OP/ED): Performed by: INTERNAL MEDICINE

## 2023-10-30 PROCEDURE — 96376 TX/PRO/DX INJ SAME DRUG ADON: CPT

## 2023-10-30 PROCEDURE — 96375 TX/PRO/DX INJ NEW DRUG ADDON: CPT | Mod: INF

## 2023-10-30 PROCEDURE — 2500000001 HC RX 250 WO HCPCS SELF ADMINISTERED DRUGS (ALT 637 FOR MEDICARE OP): Performed by: INTERNAL MEDICINE

## 2023-10-30 RX ORDER — FAMOTIDINE 10 MG/ML
20 INJECTION INTRAVENOUS ONCE
Status: COMPLETED | OUTPATIENT
Start: 2023-10-30 | End: 2023-10-30

## 2023-10-30 RX ORDER — DIPHENHYDRAMINE HYDROCHLORIDE 50 MG/ML
50 INJECTION INTRAMUSCULAR; INTRAVENOUS AS NEEDED
Status: DISCONTINUED | OUTPATIENT
Start: 2023-10-30 | End: 2023-10-30 | Stop reason: HOSPADM

## 2023-10-30 RX ORDER — DIPHENHYDRAMINE HCL 50 MG
50 CAPSULE ORAL ONCE
Status: COMPLETED | OUTPATIENT
Start: 2023-10-30 | End: 2023-10-30

## 2023-10-30 RX ORDER — ALBUTEROL SULFATE 0.83 MG/ML
3 SOLUTION RESPIRATORY (INHALATION) AS NEEDED
Status: DISCONTINUED | OUTPATIENT
Start: 2023-10-30 | End: 2023-10-30 | Stop reason: HOSPADM

## 2023-10-30 RX ORDER — HEPARIN SODIUM,PORCINE/PF 10 UNIT/ML
50 SYRINGE (ML) INTRAVENOUS AS NEEDED
Status: CANCELLED | OUTPATIENT
Start: 2023-10-30

## 2023-10-30 RX ORDER — PROCHLORPERAZINE MALEATE 10 MG
10 TABLET ORAL EVERY 6 HOURS PRN
Status: DISCONTINUED | OUTPATIENT
Start: 2023-10-30 | End: 2023-10-30 | Stop reason: HOSPADM

## 2023-10-30 RX ORDER — HEPARIN 100 UNIT/ML
500 SYRINGE INTRAVENOUS AS NEEDED
Status: CANCELLED | OUTPATIENT
Start: 2023-10-30

## 2023-10-30 RX ORDER — PROCHLORPERAZINE EDISYLATE 5 MG/ML
10 INJECTION INTRAMUSCULAR; INTRAVENOUS EVERY 6 HOURS PRN
Status: DISCONTINUED | OUTPATIENT
Start: 2023-10-30 | End: 2023-10-30 | Stop reason: HOSPADM

## 2023-10-30 RX ORDER — PALONOSETRON 0.05 MG/ML
0.25 INJECTION, SOLUTION INTRAVENOUS ONCE
Status: COMPLETED | OUTPATIENT
Start: 2023-10-30 | End: 2023-10-30

## 2023-10-30 RX ORDER — LANOLIN ALCOHOL/MO/W.PET/CERES
400 CREAM (GRAM) TOPICAL 2 TIMES DAILY
Qty: 60 TABLET | Refills: 1 | Status: SHIPPED | OUTPATIENT
Start: 2023-10-30 | End: 2023-11-03 | Stop reason: SDUPTHER

## 2023-10-30 RX ORDER — EPINEPHRINE 0.3 MG/.3ML
0.3 INJECTION SUBCUTANEOUS EVERY 5 MIN PRN
Status: DISCONTINUED | OUTPATIENT
Start: 2023-10-30 | End: 2023-10-30 | Stop reason: HOSPADM

## 2023-10-30 RX ORDER — FAMOTIDINE 10 MG/ML
20 INJECTION INTRAVENOUS ONCE AS NEEDED
Status: DISCONTINUED | OUTPATIENT
Start: 2023-10-30 | End: 2023-10-30 | Stop reason: HOSPADM

## 2023-10-30 RX ADMIN — PACLITAXEL 105 MG: 6 INJECTION, SOLUTION INTRAVENOUS at 13:00

## 2023-10-30 RX ADMIN — POTASSIUM CHLORIDE 500 ML/HR: 2 INJECTION, SOLUTION, CONCENTRATE INTRAVENOUS at 09:43

## 2023-10-30 RX ADMIN — CISPLATIN 42 MG: 1 INJECTION, SOLUTION INTRAVENOUS at 11:47

## 2023-10-30 RX ADMIN — FAMOTIDINE 20 MG: 10 INJECTION, SOLUTION INTRAVENOUS at 10:48

## 2023-10-30 RX ADMIN — POTASSIUM CHLORIDE 500 ML/HR: 2 INJECTION, SOLUTION, CONCENTRATE INTRAVENOUS at 13:01

## 2023-10-30 RX ADMIN — PALONOSETRON 250 MCG: 0.05 INJECTION, SOLUTION INTRAVENOUS at 10:42

## 2023-10-30 RX ADMIN — DEXAMETHASONE SODIUM PHOSPHATE 20 MG: 4 INJECTION, SOLUTION INTRA-ARTICULAR; INTRALESIONAL; INTRAMUSCULAR; INTRAVENOUS; SOFT TISSUE at 10:46

## 2023-10-30 RX ADMIN — FOSAPREPITANT 150 MG: 150 INJECTION, POWDER, LYOPHILIZED, FOR SOLUTION INTRAVENOUS at 11:05

## 2023-10-30 RX ADMIN — DIPHENHYDRAMINE HYDROCHLORIDE 50 MG: 50 CAPSULE ORAL at 10:41

## 2023-10-30 ASSESSMENT — PAIN SCALES - GENERAL: PAINLEVEL: 0-NO PAIN

## 2023-10-30 NOTE — PROGRESS NOTES
Cisplatin Paclitaxel    Crt improved  Kdown-normal range  Magdown- discussed tx plan today    Patient is here today for infusion - no complication since last being seen-  independant double check done prior to chemotherapy today-   b/h/ lung sounds not auscultated  patient verbalizes understanding of plan of care

## 2023-10-30 NOTE — SIGNIFICANT EVENT
10/30/23 0958   Prechemo Checklist   Has the patient been in the hospital, ED, or urgent care since last date of service No   Chemo/Immuno Consent Signed Yes   Protocol/Indications Verified Yes   Confirmed to previous date/time of medication Yes   Compared to previous dose Yes   All medications are dated accurately Yes   Pregnancy Test Negative Not applicable   Parameters Met Yes   BSA/Weight-Height Verified Yes   Dose Calculations Verified Yes

## 2023-10-31 ENCOUNTER — TELEPHONE (OUTPATIENT)
Dept: HEMATOLOGY/ONCOLOGY | Facility: CLINIC | Age: 73
End: 2023-10-31
Payer: MEDICARE

## 2023-10-31 DIAGNOSIS — E83.42 HYPOMAGNESEMIA: ICD-10-CM

## 2023-10-31 NOTE — TELEPHONE ENCOUNTER
Patient has to go out of town on Friday and wants to know if he can have labs this week.    Please advise.

## 2023-11-01 NOTE — TELEPHONE ENCOUNTER
MD Elida Crystal MA  Cc: Deann Dubois RN  Magox 400mg bid ordered     Call returned to patient.  No answer.  Message left on identifiable voicemail notifying patient OK to have labs earlier in the week or Saturday at [facility].  Message also left in regards to magnesium Oxide RX.  Call back instructions left.

## 2023-11-02 ENCOUNTER — LAB (OUTPATIENT)
Dept: LAB | Facility: CLINIC | Age: 73
End: 2023-11-02
Payer: MEDICARE

## 2023-11-02 DIAGNOSIS — C67.9 MALIGNANT NEOPLASM OF URINARY BLADDER, UNSPECIFIED SITE (MULTI): ICD-10-CM

## 2023-11-02 LAB
BASOPHILS # BLD AUTO: 0.03 X10*3/UL (ref 0–0.1)
BASOPHILS NFR BLD AUTO: 0.6 %
EOSINOPHIL # BLD AUTO: 0.13 X10*3/UL (ref 0–0.4)
EOSINOPHIL NFR BLD AUTO: 2.4 %
ERYTHROCYTE [DISTWIDTH] IN BLOOD BY AUTOMATED COUNT: 14 % (ref 11.5–14.5)
HCT VFR BLD AUTO: 34.4 % (ref 41–52)
HGB BLD-MCNC: 11.5 G/DL (ref 13.5–17.5)
IMM GRANULOCYTES # BLD AUTO: 0.04 X10*3/UL (ref 0–0.5)
IMM GRANULOCYTES NFR BLD AUTO: 0.7 % (ref 0–0.9)
LYMPHOCYTES # BLD AUTO: 0.73 X10*3/UL (ref 0.8–3)
LYMPHOCYTES NFR BLD AUTO: 13.4 %
MCH RBC QN AUTO: 30.5 PG (ref 26–34)
MCHC RBC AUTO-ENTMCNC: 33.4 G/DL (ref 32–36)
MCV RBC AUTO: 91 FL (ref 80–100)
MONOCYTES # BLD AUTO: 0.49 X10*3/UL (ref 0.05–0.8)
MONOCYTES NFR BLD AUTO: 9 %
NEUTROPHILS # BLD AUTO: 4.03 X10*3/UL (ref 1.6–5.5)
NEUTROPHILS NFR BLD AUTO: 73.9 %
NRBC BLD-RTO: ABNORMAL /100{WBCS}
PLATELET # BLD AUTO: 162 X10*3/UL (ref 150–450)
RBC # BLD AUTO: 3.77 X10*6/UL (ref 4.5–5.9)
WBC # BLD AUTO: 5.5 X10*3/UL (ref 4.4–11.3)

## 2023-11-02 PROCEDURE — 83735 ASSAY OF MAGNESIUM: CPT

## 2023-11-02 PROCEDURE — 36415 COLL VENOUS BLD VENIPUNCTURE: CPT

## 2023-11-02 PROCEDURE — 80053 COMPREHEN METABOLIC PANEL: CPT

## 2023-11-02 PROCEDURE — 85025 COMPLETE CBC W/AUTO DIFF WBC: CPT

## 2023-11-02 RX ORDER — CHOLECALCIFEROL (VITAMIN D3) 25 MCG
1000 TABLET ORAL DAILY
COMMUNITY
Start: 2019-08-31 | End: 2023-12-05 | Stop reason: ALTCHOICE

## 2023-11-03 ENCOUNTER — TELEPHONE (OUTPATIENT)
Dept: HEMATOLOGY/ONCOLOGY | Facility: CLINIC | Age: 73
End: 2023-11-03
Payer: MEDICARE

## 2023-11-03 ENCOUNTER — APPOINTMENT (OUTPATIENT)
Dept: HEMATOLOGY/ONCOLOGY | Facility: CLINIC | Age: 73
End: 2023-11-03
Payer: MEDICARE

## 2023-11-03 LAB
ALBUMIN SERPL BCP-MCNC: 4 G/DL (ref 3.4–5)
ALP SERPL-CCNC: 91 U/L (ref 33–136)
ALT SERPL W P-5'-P-CCNC: 27 U/L (ref 10–52)
ANION GAP SERPL CALC-SCNC: 16 MMOL/L (ref 10–20)
AST SERPL W P-5'-P-CCNC: 19 U/L (ref 9–39)
BILIRUB SERPL-MCNC: 0.5 MG/DL (ref 0–1.2)
BUN SERPL-MCNC: 38 MG/DL (ref 6–23)
CALCIUM SERPL-MCNC: 9.3 MG/DL (ref 8.6–10.6)
CHLORIDE SERPL-SCNC: 101 MMOL/L (ref 98–107)
CO2 SERPL-SCNC: 24 MMOL/L (ref 21–32)
CREAT SERPL-MCNC: 1.41 MG/DL (ref 0.5–1.3)
GFR SERPL CREATININE-BSD FRML MDRD: 53 ML/MIN/1.73M*2
GLUCOSE SERPL-MCNC: 104 MG/DL (ref 74–99)
MAGNESIUM SERPL-MCNC: 1.55 MG/DL (ref 1.6–2.4)
POTASSIUM SERPL-SCNC: 4.8 MMOL/L (ref 3.5–5.3)
PROT SERPL-MCNC: 6.4 G/DL (ref 6.4–8.2)
SODIUM SERPL-SCNC: 136 MMOL/L (ref 136–145)

## 2023-11-03 RX ORDER — LANOLIN ALCOHOL/MO/W.PET/CERES
400 CREAM (GRAM) TOPICAL 2 TIMES DAILY
Qty: 60 TABLET | Refills: 1 | Status: SHIPPED | OUTPATIENT
Start: 2023-11-03 | End: 2023-12-05 | Stop reason: ALTCHOICE

## 2023-11-03 NOTE — TELEPHONE ENCOUNTER
Call returned to patient.  Notified that provider sent to correct pharmacy today.  Call back instructions reviewed.  Patient verbalized understanding.

## 2023-11-03 NOTE — TELEPHONE ENCOUNTER
Dr. Jarquin patient. Patients wife called says the medication Dr. Jarquin send in for his magnesium oxide went to Pharmacy in [facility] needs to [facility] [address] [facility]

## 2023-11-06 ENCOUNTER — INFUSION (OUTPATIENT)
Dept: HEMATOLOGY/ONCOLOGY | Facility: CLINIC | Age: 73
End: 2023-11-06
Payer: MEDICARE

## 2023-11-06 VITALS
SYSTOLIC BLOOD PRESSURE: 109 MMHG | BODY MASS INDEX: 36.67 KG/M2 | WEIGHT: 214.29 LBS | RESPIRATION RATE: 18 BRPM | DIASTOLIC BLOOD PRESSURE: 66 MMHG | OXYGEN SATURATION: 100 % | TEMPERATURE: 96.8 F | HEART RATE: 80 BPM

## 2023-11-06 DIAGNOSIS — C67.9 MALIGNANT NEOPLASM OF URINARY BLADDER, UNSPECIFIED SITE (MULTI): ICD-10-CM

## 2023-11-06 PROCEDURE — 96375 TX/PRO/DX INJ NEW DRUG ADDON: CPT | Mod: INF

## 2023-11-06 PROCEDURE — 96367 TX/PROPH/DG ADDL SEQ IV INF: CPT

## 2023-11-06 PROCEDURE — 96368 THER/DIAG CONCURRENT INF: CPT

## 2023-11-06 PROCEDURE — 2500000004 HC RX 250 GENERAL PHARMACY W/ HCPCS (ALT 636 FOR OP/ED): Performed by: INTERNAL MEDICINE

## 2023-11-06 PROCEDURE — 96417 CHEMO IV INFUS EACH ADDL SEQ: CPT

## 2023-11-06 PROCEDURE — 2500000001 HC RX 250 WO HCPCS SELF ADMINISTERED DRUGS (ALT 637 FOR MEDICARE OP): Performed by: INTERNAL MEDICINE

## 2023-11-06 PROCEDURE — 96366 THER/PROPH/DIAG IV INF ADDON: CPT | Mod: INF

## 2023-11-06 PROCEDURE — 96413 CHEMO IV INFUSION 1 HR: CPT

## 2023-11-06 RX ORDER — HEPARIN SODIUM,PORCINE/PF 10 UNIT/ML
50 SYRINGE (ML) INTRAVENOUS AS NEEDED
Status: CANCELLED | OUTPATIENT
Start: 2023-11-06

## 2023-11-06 RX ORDER — FAMOTIDINE 10 MG/ML
20 INJECTION INTRAVENOUS ONCE
Status: COMPLETED | OUTPATIENT
Start: 2023-11-06 | End: 2023-11-06

## 2023-11-06 RX ORDER — PALONOSETRON 0.05 MG/ML
0.25 INJECTION, SOLUTION INTRAVENOUS ONCE
Status: COMPLETED | OUTPATIENT
Start: 2023-11-06 | End: 2023-11-06

## 2023-11-06 RX ORDER — PROCHLORPERAZINE MALEATE 10 MG
10 TABLET ORAL EVERY 6 HOURS PRN
Status: DISCONTINUED | OUTPATIENT
Start: 2023-11-06 | End: 2023-12-27 | Stop reason: HOSPADM

## 2023-11-06 RX ORDER — HEPARIN 100 UNIT/ML
500 SYRINGE INTRAVENOUS AS NEEDED
Status: CANCELLED | OUTPATIENT
Start: 2023-11-06

## 2023-11-06 RX ORDER — ALBUTEROL SULFATE 0.83 MG/ML
3 SOLUTION RESPIRATORY (INHALATION) AS NEEDED
Status: DISCONTINUED | OUTPATIENT
Start: 2023-11-06 | End: 2023-12-27 | Stop reason: HOSPADM

## 2023-11-06 RX ORDER — DIPHENHYDRAMINE HCL 50 MG
50 CAPSULE ORAL ONCE
Status: COMPLETED | OUTPATIENT
Start: 2023-11-06 | End: 2023-11-06

## 2023-11-06 RX ORDER — PROCHLORPERAZINE EDISYLATE 5 MG/ML
10 INJECTION INTRAMUSCULAR; INTRAVENOUS EVERY 6 HOURS PRN
Status: DISCONTINUED | OUTPATIENT
Start: 2023-11-06 | End: 2023-12-27 | Stop reason: HOSPADM

## 2023-11-06 RX ORDER — DIPHENHYDRAMINE HYDROCHLORIDE 50 MG/ML
50 INJECTION INTRAMUSCULAR; INTRAVENOUS AS NEEDED
Status: DISCONTINUED | OUTPATIENT
Start: 2023-11-06 | End: 2023-12-27 | Stop reason: HOSPADM

## 2023-11-06 RX ORDER — EPINEPHRINE 0.3 MG/.3ML
0.3 INJECTION SUBCUTANEOUS EVERY 5 MIN PRN
Status: DISCONTINUED | OUTPATIENT
Start: 2023-11-06 | End: 2023-12-27 | Stop reason: HOSPADM

## 2023-11-06 RX ORDER — FAMOTIDINE 10 MG/ML
20 INJECTION INTRAVENOUS ONCE AS NEEDED
Status: DISCONTINUED | OUTPATIENT
Start: 2023-11-06 | End: 2023-12-27 | Stop reason: HOSPADM

## 2023-11-06 RX ADMIN — FAMOTIDINE 20 MG: 10 INJECTION, SOLUTION INTRAVENOUS at 09:56

## 2023-11-06 RX ADMIN — POTASSIUM CHLORIDE 500 ML/HR: 2 INJECTION, SOLUTION, CONCENTRATE INTRAVENOUS at 13:08

## 2023-11-06 RX ADMIN — DEXAMETHASONE SODIUM PHOSPHATE 20 MG: 4 INJECTION, SOLUTION INTRAMUSCULAR; INTRAVENOUS at 10:02

## 2023-11-06 RX ADMIN — PACLITAXEL 105 MG: 6 INJECTION, SOLUTION INTRAVENOUS at 13:09

## 2023-11-06 RX ADMIN — MAGNESIUM SULFATE HEPTAHYDRATE 500 ML/HR: 500 INJECTION, SOLUTION INTRAMUSCULAR; INTRAVENOUS at 10:51

## 2023-11-06 RX ADMIN — DIPHENHYDRAMINE HYDROCHLORIDE 50 MG: 50 CAPSULE ORAL at 09:56

## 2023-11-06 RX ADMIN — PALONOSETRON 250 MCG: 0.05 INJECTION, SOLUTION INTRAVENOUS at 09:56

## 2023-11-06 RX ADMIN — SODIUM CHLORIDE 150 MG: 0.9 INJECTION, SOLUTION INTRAVENOUS at 10:19

## 2023-11-06 RX ADMIN — SODIUM CHLORIDE 42 MG: 9 INJECTION, SOLUTION INTRAVENOUS at 12:03

## 2023-11-06 ASSESSMENT — PAIN SCALES - GENERAL: PAINLEVEL: 0-NO PAIN

## 2023-11-07 ENCOUNTER — OFFICE VISIT (OUTPATIENT)
Dept: HEMATOLOGY/ONCOLOGY | Facility: CLINIC | Age: 73
End: 2023-11-07
Payer: MEDICARE

## 2023-11-07 VITALS
SYSTOLIC BLOOD PRESSURE: 132 MMHG | HEIGHT: 64 IN | BODY MASS INDEX: 37.26 KG/M2 | TEMPERATURE: 97.2 F | OXYGEN SATURATION: 99 % | HEART RATE: 72 BPM | WEIGHT: 218.26 LBS | DIASTOLIC BLOOD PRESSURE: 74 MMHG

## 2023-11-07 DIAGNOSIS — C67.9 MALIGNANT NEOPLASM OF URINARY BLADDER, UNSPECIFIED SITE (MULTI): ICD-10-CM

## 2023-11-07 PROCEDURE — 3078F DIAST BP <80 MM HG: CPT | Performed by: INTERNAL MEDICINE

## 2023-11-07 PROCEDURE — 1126F AMNT PAIN NOTED NONE PRSNT: CPT | Performed by: INTERNAL MEDICINE

## 2023-11-07 PROCEDURE — 1036F TOBACCO NON-USER: CPT | Performed by: INTERNAL MEDICINE

## 2023-11-07 PROCEDURE — 99215 OFFICE O/P EST HI 40 MIN: CPT | Performed by: INTERNAL MEDICINE

## 2023-11-07 PROCEDURE — 1159F MED LIST DOCD IN RCRD: CPT | Performed by: INTERNAL MEDICINE

## 2023-11-07 PROCEDURE — 3075F SYST BP GE 130 - 139MM HG: CPT | Performed by: INTERNAL MEDICINE

## 2023-11-07 ASSESSMENT — PATIENT HEALTH QUESTIONNAIRE - PHQ9
8. MOVING OR SPEAKING SO SLOWLY THAT OTHER PEOPLE COULD HAVE NOTICED. OR THE OPPOSITE, BEING SO FIGETY OR RESTLESS THAT YOU HAVE BEEN MOVING AROUND A LOT MORE THAN USUAL: MORE THAN HALF THE DAYS
6. FEELING BAD ABOUT YOURSELF - OR THAT YOU ARE A FAILURE OR HAVE LET YOURSELF OR YOUR FAMILY DOWN: 0
SUM OF ALL RESPONSES TO PHQ9 QUESTIONS 1 AND 2: 0
2. FEELING DOWN, DEPRESSED, IRRITABLE, OR HOPELESS: NOT AT ALL
6. FEELING BAD ABOUT YOURSELF - OR THAT YOU ARE A FAILURE OR HAVE LET YOURSELF OR YOUR FAMILY DOWN: NOT AT ALL
1. LITTLE INTEREST OR PLEASURE IN DOING THINGS: NOT AT ALL
9. THOUGHTS THAT YOU WOULD BE BETTER OFF DEAD, OR OF HURTING YOURSELF: NOT AT ALL
2. FEELING DOWN, DEPRESSED, IRRITABLE, OR HOPELESS: 0
3. TROUBLE FALLING OR STAYING ASLEEP OR SLEEPING TOO MUCH: NOT AT ALL
7. TROUBLE CONCENTRATING ON THINGS, SUCH AS READING THE NEWSPAPER OR WATCHING TELEVISION: NOT AT ALL
1. LITTLE INTEREST OR PLEASURE IN DOING THINGS: 0
4. FEELING TIRED OR HAVING LITTLE ENERGY: 0
5. POOR APPETITE OR OVEREATING: 0
7. TROUBLE CONCENTRATING ON THINGS, SUCH AS READING THE NEWSPAPER OR WATCHING TELEVISION: 0
8. MOVING OR SPEAKING SO SLOWLY THAT OTHER PEOPLE COULD HAVE NOTICED. OR THE OPPOSITE, BEING SO FIGETY OR RESTLESS THAT YOU HAVE BEEN MOVING AROUND A LOT MORE THAN USUAL: MORE THAN HALF THE DAYS
SUM OF ALL RESPONSES TO PHQ QUESTIONS 1-9: 2
5. POOR APPETITE OR OVEREATING: NOT AT ALL
4. FEELING TIRED OR HAVING LITTLE ENERGY: NOT AT ALL
2. FEELING DOWN, DEPRESSED OR HOPELESS: NOT AT ALL
8. MOVING OR SPEAKING SO SLOWLY THAT OTHER PEOPLE COULD HAVE NOTICED. OR THE OPPOSITE, BEING SO FIGETY OR RESTLESS THAT YOU HAVE BEEN MOVING AROUND A LOT MORE THAN USUAL: MORE THAN HALF THE DAYS
7. TROUBLE CONCENTRATING ON THINGS, SUCH AS READING THE NEWSPAPER OR WATCHING TELEVISION: 0
9. THOUGHTS THAT YOU WOULD BE BETTER OFF DEAD, OR OF HURTING YOURSELF: NOT AT ALL
4. FEELING TIRED OR HAVING LITTLE ENERGY: NOT AT ALL
7. TROUBLE CONCENTRATING ON THINGS, SUCH AS READING THE NEWSPAPER OR WATCHING TELEVISION: NOT AT ALL
5. POOR APPETITE OR OVEREATING: NOT AT ALL
4. FEELING TIRED OR HAVING LITTLE ENERGY: NOT AT ALL
6. FEELING BAD ABOUT YOURSELF - OR THAT YOU ARE A FAILURE OR HAVE LET YOURSELF OR YOUR FAMILY DOWN: NOT AT ALL
9. THOUGHTS THAT YOU WOULD BE BETTER OFF DEAD, OR OF HURTING YOURSELF: 0
6. FEELING BAD ABOUT YOURSELF - OR THAT YOU ARE A FAILURE OR HAVE LET YOURSELF OR YOUR FAMILY DOWN: NOT AT ALL
2. FEELING DOWN, DEPRESSED OR HOPELESS: NOT AT ALL
4. FEELING TIRED OR HAVING LITTLE ENERGY: 0
5. POOR APPETITE OR OVEREATING: NOT AT ALL
5. POOR APPETITE OR OVEREATING: 0
9. THOUGHTS THAT YOU WOULD BE BETTER OFF DEAD, OR OF HURTING YOURSELF: 0
3. TROUBLE FALLING OR STAYING ASLEEP OR SLEEPING TOO MUCH: NOT AT ALL
1. LITTLE INTEREST OR PLEASURE IN DOING THINGS: NOT AT ALL
8. MOVING OR SPEAKING SO SLOWLY THAT OTHER PEOPLE COULD HAVE NOTICED. OR THE OPPOSITE, BEING SO FIGETY OR RESTLESS THAT YOU HAVE BEEN MOVING AROUND A LOT MORE THAN USUAL: MORE THAN HALF THE DAYS
3. TROUBLE FALLING OR STAYING ASLEEP OR SLEEPING TOO MUCH: NOT AT ALL
3. TROUBLE FALLING OR STAYING ASLEEP OR SLEEPING TOO MUCH: NOT AT ALL
SUM OF ALL RESPONSES TO PHQ QUESTIONS 1-9: 2
2. FEELING DOWN, DEPRESSED OR HOPELESS: NOT AT ALL
9. THOUGHTS THAT YOU WOULD BE BETTER OFF DEAD, OR OF HURTING YOURSELF: NOT AT ALL
6. FEELING BAD ABOUT YOURSELF - OR THAT YOU ARE A FAILURE OR HAVE LET YOURSELF OR YOUR FAMILY DOWN: NOT AT ALL
1. LITTLE INTEREST OR PLEASURE IN DOING THINGS: NOT AT ALL
9. THOUGHTS THAT YOU WOULD BE BETTER OFF DEAD, OR OF HURTING YOURSELF: NOT AT ALL
1. LITTLE INTEREST OR PLEASURE IN DOING THINGS: NOT AT ALL
6. FEELING BAD ABOUT YOURSELF - OR THAT YOU ARE A FAILURE OR HAVE LET YOURSELF OR YOUR FAMILY DOWN: 0
3. TROUBLE FALLING OR STAYING ASLEEP OR SLEEPING TOO MUCH: 0
SUM OF ALL RESPONSES TO PHQ QUESTIONS 1-9: 2
7. TROUBLE CONCENTRATING ON THINGS, SUCH AS READING THE NEWSPAPER OR WATCHING TELEVISION: NOT AT ALL
5. POOR APPETITE OR OVEREATING: NOT AT ALL
2. FEELING DOWN, DEPRESSED, IRRITABLE, OR HOPELESS: NOT AT ALL
1. LITTLE INTEREST OR PLEASURE IN DOING THINGS: 0
4. FEELING TIRED OR HAVING LITTLE ENERGY: NOT AT ALL
SUM OF ALL RESPONSES TO PHQ QUESTIONS 1-9: 2
8. MOVING OR SPEAKING SO SLOWLY THAT OTHER PEOPLE COULD HAVE NOTICED. OR THE OPPOSITE, BEING SO FIGETY OR RESTLESS THAT YOU HAVE BEEN MOVING AROUND A LOT MORE THAN USUAL: 2
8. MOVING OR SPEAKING SO SLOWLY THAT OTHER PEOPLE COULD HAVE NOTICED. OR THE OPPOSITE, BEING SO FIGETY OR RESTLESS THAT YOU HAVE BEEN MOVING AROUND A LOT MORE THAN USUAL: 2
3. TROUBLE FALLING OR STAYING ASLEEP OR SLEEPING TOO MUCH: 0
7. TROUBLE CONCENTRATING ON THINGS, SUCH AS READING THE NEWSPAPER OR WATCHING TELEVISION: NOT AT ALL
1. LITTLE INTEREST OR PLEASURE IN DOING THINGS: NOT AT ALL
2. FEELING DOWN, DEPRESSED, IRRITABLE, OR HOPELESS: 0

## 2023-11-07 ASSESSMENT — PAIN SCALES - GENERAL: PAINLEVEL: 0-NO PAIN

## 2023-11-07 ASSESSMENT — COLUMBIA-SUICIDE SEVERITY RATING SCALE - C-SSRS
2. HAVE YOU ACTUALLY HAD ANY THOUGHTS OF KILLING YOURSELF?: NO
1. IN THE PAST MONTH, HAVE YOU WISHED YOU WERE DEAD OR WISHED YOU COULD GO TO SLEEP AND NOT WAKE UP?: NO
6. HAVE YOU EVER DONE ANYTHING, STARTED TO DO ANYTHING, OR PREPARED TO DO ANYTHING TO END YOUR LIFE?: NO

## 2023-11-07 ASSESSMENT — ENCOUNTER SYMPTOMS
LOSS OF SENSATION IN FEET: 0
DEPRESSION: 0
OCCASIONAL FEELINGS OF UNSTEADINESS: 0

## 2023-11-07 NOTE — PROGRESS NOTES
Patient ID: Keenan Reveles is a 72 y.o. male.  Referring Physician: Eladio Jarquin MD  [address] [facility], [place] [address]  Primary Care Provider: Syed Brewster MD      Patient instruction  Will receive week #6 on November 13 , 2023, patient to continue weekly chemotherapy until last chemotherapy on November 27, 2023 (week #8) as patient is finishing radiation therapy on 4 December 2023.    Return for follow-up in 1 month  CBC, CMP, magnesium      ASSESSMENT, PROBLEM LIST, DECISION MAKING, PLAN.    Initial History of bladder cancer initially superficial diagnosed sometime in 2019 and was treated with TURBT and later received intravesicular BCG x12 treatment which was discontinued in 2022.    Muscle invasive bladder carcinoma diagnosed in August 2023, staging work-up revealed 8 mm left iliac chain lymph node concerning for metastatic lymph node in addition to lobular enhancing urinary bladder mass along the left lateral and posterior wall and 6 mm pulmonary nodule in superior segment of right lower lobe 3 mm pulmonary nodule in right lower lobe.  PET scan is currently pending  Patient has declined surgery and is interested in bladder preservation and has been seen by Dr. Negro    Patient was started on bladder preservation concomitant chemoradiation using cisplatin and Taxol started on October 9, 2023          PAST MEDICAL HISTORY:       gouty arthritis, hearing difficulty, coronary artery disease status post stent and has been followed by Dr. Armstrong, hypertension,        INTERVAL HISTORY :     Patient is here today for follow-up patient has started taking Chemoradiation from October 9, 2023 under care of Dr. Negro, he seems to be tolerating without any major problem.    So far he seems to be tolerating treatment well, he did have off-and-on hematuria and large blood clot came out earlier this week but he is otherwise doing well.      PHYSICAL EXAM:  General: Conscious,  alert, oriented x3, not in acute distress.  HEENT:    No icterus.    Chest:Bilateral symmetrical,     CVS:  S1, S2.    Abdomen:  Soft, no palpable mass   Extremities: No clubbing, cyanosis,     Skin: No petechial rash.        ASSESSMENT & PLAN:    Muscle invasive bladder carcinoma diagnosed in August 2023, staging work-up revealed 8 mm left iliac chain lymph node concerning for metastatic lymph node in addition to lobular enhancing urinary bladder mass along the left lateral and posterior wall and 6 mm pulmonary nodule in superior segment of right lower lobe 3 mm pulmonary nodule in right lower lobe.  PET scan showed no pulmonary nodule activity, there was FDG avid left iliac chain lymph node highly suspicious for metastatic disease  I have discussed with Dr. Negro in the past and left iliac lymph node would be in the radiation field     Patient was started on bladder preservation concomitant chemoradiation using cisplatin and Taxol   Week #1 was on October 9, 2023  Will receive week #6 on November 13 , 2023, patient to continue weekly chemotherapy until last chemotherapy on November 27, 2023 (week #8) as patient is finishing radiation therapy on 4 December 2023.    I explained to the patient that in next 2 to 3-week he will have increasing side effect possibly from radiation, advised to drink plenty of fluids, he had a worsening renal function, renal ultrasound has been ordered      Risk benefit and side effect of pharmacological treatment for malignancy  including possibility of life-threatening side effects were discussed.  Continue intensive monitoring for toxicity and agreed to be compliant for follow-up.  We will also require close monitoring for cytopenia, electrolyte imbalance, liver and renal function and/or imaging.      Discussed with patient and wife                LABS:  Lab on 11/02/2023   Component Date Value Ref Range Status    WBC 11/02/2023 5.5  4.4 - 11.3 x10*3/uL Final    nRBC 11/02/2023     Final    RBC 11/02/2023 3.77 (L)  4.50 - 5.90 x10*6/uL Final    Hemoglobin 11/02/2023 11.5 (L)  13.5 - 17.5 g/dL Final    Hematocrit 11/02/2023 34.4 (L)  41.0 - 52.0 % Final    MCV 11/02/2023 91  80 - 100 fL Final    MCH 11/02/2023 30.5  26.0 - 34.0 pg Final    MCHC 11/02/2023 33.4  32.0 - 36.0 g/dL Final    RDW 11/02/2023 14.0  11.5 - 14.5 % Final    Platelets 11/02/2023 162  150 - 450 x10*3/uL Final    Neutrophils % 11/02/2023 73.9  40.0 - 80.0 % Final    Immature Granulocytes %, Automated 11/02/2023 0.7  0.0 - 0.9 % Final    Lymphocytes % 11/02/2023 13.4  13.0 - 44.0 % Final    Monocytes % 11/02/2023 9.0  2.0 - 10.0 % Final    Eosinophils % 11/02/2023 2.4  0.0 - 6.0 % Final    Basophils % 11/02/2023 0.6  0.0 - 2.0 % Final    Neutrophils Absolute 11/02/2023 4.03  1.60 - 5.50 x10*3/uL Final    Immature Granulocytes Absolute, Au* 11/02/2023 0.04  0.00 - 0.50 x10*3/uL Final    Lymphocytes Absolute 11/02/2023 0.73 (L)  0.80 - 3.00 x10*3/uL Final    Monocytes Absolute 11/02/2023 0.49  0.05 - 0.80 x10*3/uL Final    Eosinophils Absolute 11/02/2023 0.13  0.00 - 0.40 x10*3/uL Final    Basophils Absolute 11/02/2023 0.03  0.00 - 0.10 x10*3/uL Final    Glucose 11/02/2023 104 (H)  74 - 99 mg/dL Final    Sodium 11/02/2023 136  136 - 145 mmol/L Final    Potassium 11/02/2023 4.8  3.5 - 5.3 mmol/L Final    Chloride 11/02/2023 101  98 - 107 mmol/L Final    Bicarbonate 11/02/2023 24  21 - 32 mmol/L Final    Anion Gap 11/02/2023 16  10 - 20 mmol/L Final    Urea Nitrogen 11/02/2023 38 (H)  6 - 23 mg/dL Final    Creatinine 11/02/2023 1.41 (H)  0.50 - 1.30 mg/dL Final    eGFR 11/02/2023 53 (L)  >60 mL/min/1.73m*2 Final    Calcium 11/02/2023 9.3  8.6 - 10.6 mg/dL Final    Albumin 11/02/2023 4.0  3.4 - 5.0 g/dL Final    Alkaline Phosphatase 11/02/2023 91  33 - 136 U/L Final    Total Protein 11/02/2023 6.4  6.4 - 8.2 g/dL Final    AST 11/02/2023 19  9 - 39 U/L Final    Bilirubin, Total 11/02/2023 0.5  0.0 - 1.2 mg/dL Final    ALT  11/02/2023 27  10 - 52 U/L Final    Magnesium 11/02/2023 1.55 (L)  1.60 - 2.40 mg/dL Final   Lab on 10/27/2023   Component Date Value Ref Range Status    WBC 10/27/2023 4.3 (L)  4.4 - 11.3 x10*3/uL Final    nRBC 10/27/2023    Final    RBC 10/27/2023 3.62 (L)  4.50 - 5.90 x10*6/uL Final    Hemoglobin 10/27/2023 11.2 (L)  13.5 - 17.5 g/dL Final    Hematocrit 10/27/2023 32.9 (L)  41.0 - 52.0 % Final    MCV 10/27/2023 91  80 - 100 fL Final    MCH 10/27/2023 30.9  26.0 - 34.0 pg Final    MCHC 10/27/2023 34.0  32.0 - 36.0 g/dL Final    RDW 10/27/2023 13.9  11.5 - 14.5 % Final    Platelets 10/27/2023 163  150 - 450 x10*3/uL Final    MPV 10/27/2023 9.9  7.5 - 11.5 fL Final    Neutrophils % 10/27/2023 74.8  40.0 - 80.0 % Final    Immature Granulocytes %, Automated 10/27/2023 0.5  0.0 - 0.9 % Final    Lymphocytes % 10/27/2023 14.9  13.0 - 44.0 % Final    Monocytes % 10/27/2023 5.8  2.0 - 10.0 % Final    Eosinophils % 10/27/2023 3.5  0.0 - 6.0 % Final    Basophils % 10/27/2023 0.5  0.0 - 2.0 % Final    Neutrophils Absolute 10/27/2023 3.22  1.60 - 5.50 x10*3/uL Final    Immature Granulocytes Absolute, Au* 10/27/2023 0.02  0.00 - 0.50 x10*3/uL Final    Lymphocytes Absolute 10/27/2023 0.64 (L)  0.80 - 3.00 x10*3/uL Final    Monocytes Absolute 10/27/2023 0.25  0.05 - 0.80 x10*3/uL Final    Eosinophils Absolute 10/27/2023 0.15  0.00 - 0.40 x10*3/uL Final    Basophils Absolute 10/27/2023 0.02  0.00 - 0.10 x10*3/uL Final    Glucose 10/27/2023 242 (H)  74 - 99 mg/dL Final    Sodium 10/27/2023 134 (L)  136 - 145 mmol/L Final    Potassium 10/27/2023 4.6  3.5 - 5.3 mmol/L Final    Chloride 10/27/2023 102  98 - 107 mmol/L Final    Bicarbonate 10/27/2023 24  21 - 32 mmol/L Final    Anion Gap 10/27/2023 13  10 - 20 mmol/L Final    Urea Nitrogen 10/27/2023 35 (H)  6 - 23 mg/dL Final    Creatinine 10/27/2023 1.38 (H)  0.50 - 1.30 mg/dL Final    eGFR 10/27/2023 54 (L)  >60 mL/min/1.73m*2 Final    Calcium 10/27/2023 8.7  8.6 - 10.6 mg/dL Final     Albumin 10/27/2023 3.7  3.4 - 5.0 g/dL Final    Alkaline Phosphatase 10/27/2023 94  33 - 136 U/L Final    Total Protein 10/27/2023 5.8 (L)  6.4 - 8.2 g/dL Final    AST 10/27/2023 14  9 - 39 U/L Final    Bilirubin, Total 10/27/2023 0.5  0.0 - 1.2 mg/dL Final    ALT 10/27/2023 21  10 - 52 U/L Final    Magnesium 10/27/2023 1.37 (L)  1.60 - 2.40 mg/dL Final    Magnesium 10/27/2023 1.39 (L)  1.60 - 2.40 mg/dL Final   Lab on 10/13/2023   Component Date Value Ref Range Status    WBC 10/20/2023 4.9  4.4 - 11.3 x10*3/uL Final    nRBC 10/20/2023    Final    RBC 10/20/2023 4.00 (L)  4.50 - 5.90 x10*6/uL Final    Hemoglobin 10/20/2023 12.3 (L)  13.5 - 17.5 g/dL Final    Hematocrit 10/20/2023 37.0 (L)  41.0 - 52.0 % Final    MCV 10/20/2023 93  80 - 100 fL Final    MCH 10/20/2023 30.8  26.0 - 34.0 pg Final    MCHC 10/20/2023 33.2  32.0 - 36.0 g/dL Final    RDW 10/20/2023 14.0  11.5 - 14.5 % Final    Platelets 10/20/2023 193  150 - 450 x10*3/uL Final    MPV 10/20/2023 10.1  7.5 - 11.5 fL Final    Neutrophils % 10/20/2023 71.2  40.0 - 80.0 % Final    Immature Granulocytes %, Automated 10/20/2023 0.4  0.0 - 0.9 % Final    Lymphocytes % 10/20/2023 18.0  13.0 - 44.0 % Final    Monocytes % 10/20/2023 7.8  2.0 - 10.0 % Final    Eosinophils % 10/20/2023 2.2  0.0 - 6.0 % Final    Basophils % 10/20/2023 0.4  0.0 - 2.0 % Final    Neutrophils Absolute 10/20/2023 3.48  1.60 - 5.50 x10*3/uL Final    Immature Granulocytes Absolute, Au* 10/20/2023 0.02  0.00 - 0.50 x10*3/uL Final    Lymphocytes Absolute 10/20/2023 0.88  0.80 - 3.00 x10*3/uL Final    Monocytes Absolute 10/20/2023 0.38  0.05 - 0.80 x10*3/uL Final    Eosinophils Absolute 10/20/2023 0.11  0.00 - 0.40 x10*3/uL Final    Basophils Absolute 10/20/2023 0.02  0.00 - 0.10 x10*3/uL Final    Glucose 10/20/2023 112 (H)  74 - 99 mg/dL Final    Sodium 10/20/2023 138  136 - 145 mmol/L Final    Potassium 10/20/2023 4.9  3.5 - 5.3 mmol/L Final    Chloride 10/20/2023 105  98 - 107 mmol/L Final     Bicarbonate 10/20/2023 23  21 - 32 mmol/L Final    Anion Gap 10/20/2023 15  10 - 20 mmol/L Final    Urea Nitrogen 10/20/2023 44 (H)  6 - 23 mg/dL Final    Creatinine 10/20/2023 1.43 (H)  0.50 - 1.30 mg/dL Final    eGFR 10/20/2023 52 (L)  >60 mL/min/1.73m*2 Final    Calcium 10/20/2023 9.5  8.6 - 10.6 mg/dL Final    Albumin 10/20/2023 4.0  3.4 - 5.0 g/dL Final    Alkaline Phosphatase 10/20/2023 90  33 - 136 U/L Final    Total Protein 10/20/2023 6.3 (L)  6.4 - 8.2 g/dL Final    AST 10/20/2023 21  9 - 39 U/L Final    Bilirubin, Total 10/20/2023 0.7  0.0 - 1.2 mg/dL Final    ALT 10/20/2023 34  10 - 52 U/L Final    Magnesium 10/20/2023 1.58 (L)  1.60 - 2.40 mg/dL Final    WBC 10/13/2023 8.7  4.4 - 11.3 x10*3/uL Final    nRBC 10/13/2023    Final    RBC 10/13/2023 4.25 (L)  4.50 - 5.90 x10*6/uL Final    Hemoglobin 10/13/2023 12.9 (L)  13.5 - 17.5 g/dL Final    Hematocrit 10/13/2023 38.2 (L)  41.0 - 52.0 % Final    MCV 10/13/2023 90  80 - 100 fL Final    MCH 10/13/2023 30.4  26.0 - 34.0 pg Final    MCHC 10/13/2023 33.8  32.0 - 36.0 g/dL Final    RDW 10/13/2023 13.6  11.5 - 14.5 % Final    Platelets 10/13/2023 199  150 - 450 x10*3/uL Final    MPV 10/13/2023 10.2  7.5 - 11.5 fL Final    Neutrophils % 10/13/2023 75.3  40.0 - 80.0 % Final    Immature Granulocytes %, Automated 10/13/2023 0.2  0.0 - 0.9 % Final    Lymphocytes % 10/13/2023 19.0  13.0 - 44.0 % Final    Monocytes % 10/13/2023 4.9  2.0 - 10.0 % Final    Eosinophils % 10/13/2023 0.6  0.0 - 6.0 % Final    Basophils % 10/13/2023 0.0  0.0 - 2.0 % Final    Neutrophils Absolute 10/13/2023 6.54 (H)  1.60 - 5.50 x10*3/uL Final    Immature Granulocytes Absolute, Au* 10/13/2023 0.02  0.00 - 0.50 x10*3/uL Final    Lymphocytes Absolute 10/13/2023 1.65  0.80 - 3.00 x10*3/uL Final    Monocytes Absolute 10/13/2023 0.43  0.05 - 0.80 x10*3/uL Final    Eosinophils Absolute 10/13/2023 0.05  0.00 - 0.40 x10*3/uL Final    Basophils Absolute 10/13/2023 0.00  0.00 - 0.10 x10*3/uL Final     Glucose 10/13/2023 233 (H)  74 - 99 mg/dL Final    Sodium 10/13/2023 133 (L)  136 - 145 mmol/L Final    Potassium 10/13/2023 4.3  3.5 - 5.3 mmol/L Final    Chloride 10/13/2023 98  98 - 107 mmol/L Final    Bicarbonate 10/13/2023 23  21 - 32 mmol/L Final    Anion Gap 10/13/2023 16  10 - 20 mmol/L Final    Urea Nitrogen 10/13/2023 53 (H)  6 - 23 mg/dL Final    Creatinine 10/13/2023 1.75 (H)  0.50 - 1.30 mg/dL Final    eGFR 10/13/2023 41 (L)  >60 mL/min/1.73m*2 Final    Calcium 10/13/2023 9.4  8.6 - 10.6 mg/dL Final    Albumin 10/13/2023 3.9  3.4 - 5.0 g/dL Final    Alkaline Phosphatase 10/13/2023 77  33 - 136 U/L Final    Total Protein 10/13/2023 6.4  6.4 - 8.2 g/dL Final    AST 10/13/2023 17  9 - 39 U/L Final    Bilirubin, Total 10/13/2023 1.2  0.0 - 1.2 mg/dL Final    ALT 10/13/2023 23  10 - 52 U/L Final    Magnesium 10/13/2023 2.01  1.60 - 2.40 mg/dL Final   Infusion on 10/09/2023   Component Date Value Ref Range Status    Hepatitis B Surface AB 10/06/2023 <3.1  <10.0 mIU/mL Final    Hepatitis B Core AB- Total 10/06/2023 Nonreactive  Nonreactive Final    Hepatitis B Surface AG 10/06/2023 Nonreactive  Nonreactive Final    Magnesium 10/06/2023 1.87  1.60 - 2.40 mg/dL Final    Glucose 10/06/2023 118 (H)  74 - 99 mg/dL Final    Sodium 10/06/2023 137  136 - 145 mmol/L Final    Potassium 10/06/2023 4.8  3.5 - 5.3 mmol/L Final    Chloride 10/06/2023 103  98 - 107 mmol/L Final    Bicarbonate 10/06/2023 24  21 - 32 mmol/L Final    Anion Gap 10/06/2023 15  10 - 20 mmol/L Final    Urea Nitrogen 10/06/2023 42 (H)  6 - 23 mg/dL Final    Creatinine 10/06/2023 1.50 (H)  0.50 - 1.30 mg/dL Final    eGFR 10/06/2023 49 (L)  >60 mL/min/1.73m*2 Final    Calcium 10/06/2023 10.1  8.6 - 10.6 mg/dL Final    Albumin 10/06/2023 4.2  3.4 - 5.0 g/dL Final    Alkaline Phosphatase 10/06/2023 92  33 - 136 U/L Final    Total Protein 10/06/2023 6.8  6.4 - 8.2 g/dL Final    AST 10/06/2023 18  9 - 39 U/L Final    Bilirubin, Total 10/06/2023 0.8   "0.0 - 1.2 mg/dL Final    ALT 10/06/2023 22  10 - 52 U/L Final    WBC 10/06/2023 7.4  4.4 - 11.3 x10*3/uL Final    nRBC 10/06/2023    Final    RBC 10/06/2023 4.51  4.50 - 5.90 x10*6/uL Final    Hemoglobin 10/06/2023 13.6  13.5 - 17.5 g/dL Final    Hematocrit 10/06/2023 40.9 (L)  41.0 - 52.0 % Final    MCV 10/06/2023 91  80 - 100 fL Final    MCH 10/06/2023 30.2  26.0 - 34.0 pg Final    MCHC 10/06/2023 33.3  32.0 - 36.0 g/dL Final    RDW 10/06/2023 14.0  11.5 - 14.5 % Final    Platelets 10/06/2023 201  150 - 450 x10*3/uL Final    MPV 10/06/2023 10.1  7.5 - 11.5 fL Final    Neutrophils % 10/06/2023 61.6  40.0 - 80.0 % Final    Immature Granulocytes %, Automated 10/06/2023 0.3  0.0 - 0.9 % Final    Lymphocytes % 10/06/2023 26.1  13.0 - 44.0 % Final    Monocytes % 10/06/2023 8.5  2.0 - 10.0 % Final    Eosinophils % 10/06/2023 3.0  0.0 - 6.0 % Final    Basophils % 10/06/2023 0.5  0.0 - 2.0 % Final    Neutrophils Absolute 10/06/2023 4.56  1.60 - 5.50 x10*3/uL Final    Immature Granulocytes Absolute, Au* 10/06/2023 0.02  0.00 - 0.50 x10*3/uL Final    Lymphocytes Absolute 10/06/2023 1.93  0.80 - 3.00 x10*3/uL Final    Monocytes Absolute 10/06/2023 0.63  0.05 - 0.80 x10*3/uL Final    Eosinophils Absolute 10/06/2023 0.22  0.00 - 0.40 x10*3/uL Final    Basophils Absolute 10/06/2023 0.04  0.00 - 0.10 x10*3/uL Final       IMAGING:  No results found.    VITALS: BSA: 2.11 meters squared /74   Pulse 72   Temp 36.2 °C (97.2 °F) (Temporal)   Ht 1.626 m (5' 4.02\")   Wt 99 kg (218 lb 4.1 oz)   SpO2 99%   BMI 37.44 kg/m²     Current Outpatient Medications   Medication Sig Dispense Refill    allopurinol (Zyloprim) 100 mg tablet Take 1 tablet (100 mg) by mouth 2 times a day.      aspirin 81 mg EC tablet Take 1 tablet (81 mg) by mouth once daily.      atorvastatin (Lipitor) 20 mg tablet Take 1 tablet (20 mg) by mouth once daily.      cholecalciferol (Vitamin D-3) 25 MCG (1000 UT) tablet Take 1 tablet (1,000 Units) by mouth once " daily.      Cinnamon 500 mg capsule Take by mouth.      lisinopril 20 mg tablet Take 1 tablet (20 mg) by mouth once daily.      magnesium oxide (Mag-Ox) 400 mg (241.3 mg magnesium) tablet Take 1 tablet (400 mg) by mouth 2 times a day. 60 tablet 1    metoprolol succinate XL (Toprol-XL) 50 mg 24 hr tablet Take 1 tablet (50 mg) by mouth once daily.      triamterene-hydrochlorothiazid (Dyazide) 37.5-25 mg capsule Take 1 capsule by mouth every other day.      alendronate (Fosamax) 70 mg/75 mL solution Take 75 mL (70 mg) by mouth every 7 days.      apple cider vinegar 500 mg tablet as directed Orally      Colcrys 0.6 mg tablet Take 1 tablet (0.6 mg) by mouth once daily.      dexAMETHasone (Decadron) 4 mg tablet Take 12 mg (3 tablets) night before and morning of first chemotherapy 6 tablet 0    folic acid (Folvite) 1 mg tablet Take 1 tablet (1 mg) by mouth once daily.      iodine-sodium iodide 2 % solution as directed Externally      methotrexate (Trexall) 2.5 mg tablet Take 6 tablets (15 mg total) by mouth 1 (one) time per week.      nitroglycerin (Nitrostat) 0.4 mg SL tablet Place 1 tablet (0.4 mg) under the tongue every 5 minutes if needed. For 3 Doses Only - Then Contact Ems      ondansetron ODT (Zofran-ODT) 8 mg disintegrating tablet Take 1 tablet (8 mg) by mouth 3 times a day as needed for nausea.      pegloticase (Krystexxa) Infuse into a venous catheter every 14 (fourteen) days.      povidone-iodine 5 % ophthalmic solution 1 drop if needed for wound care.      prochlorperazine (Compazine) 10 mg tablet Take 1 tablet (10 mg) by mouth 3 times a day as needed for nausea.      turmeric/turmeric ext/pepr ext (turmeric-turmeric ext-pepper) 900-100-5 mg capsule Take by mouth once daily.       No current facility-administered medications for this visit.     Facility-Administered Medications Ordered in Other Visits   Medication Dose Route Frequency Provider Last Rate Last Admin    albuterol 2.5 mg /3 mL (0.083 %) nebulizer  solution 3 mL  3 mL nebulization PRN Eladio Kincaid MD        dextrose 5 % in water (D5W) bolus  500 mL intravenous PRN Eladio Kincaid MD        diphenhydrAMINE (BENADryl) injection 50 mg  50 mg intravenous PRN Eladio Kincaid MD        EPINEPHrine (Epipen) injection syringe 0.3 mg  0.3 mg intramuscular q5 min PRN Eladio Kincaid MD        famotidine PF (Pepcid) injection 20 mg  20 mg intravenous Once PRN Eladio Kincaid MD        methylPREDNISolone sod succinate (PF) (SOLU-Medrol) 40 mg/mL injection 40 mg  40 mg intravenous PRN Eladio Kincaid MD        prochlorperazine (Compazine) injection 10 mg  10 mg intravenous q6h PRN Eladio Kincaid MD        prochlorperazine (Compazine) tablet 10 mg  10 mg oral q6h PRN Eladio Kincaid MD        sodium chloride 0.9 % bolus 500 mL  500 mL intravenous PRN lEadio Kincaid MD           ALLERGY: Penicillins    SOCIAL HISTORY: He  reports no history of alcohol use. He  reports no history of drug use.    Medication reviewed in e-chart  Patient is monitored for medication toxicity  labs reviewed and interpreted independently, X rays independently reviewed  Notes from other physicians involved in care were reviewed    Charting was completed using voice recognition technology and may include unintended errors.    ELADIO KINCAID MD, OLIVIA.    Tyrone Shane Master Clinician in Hematology and Oncology  Medical Director, [facility] at [place].  [place] office  Phone [phone]  Fax      [phone]  [place] /[place].  Phone [phone]  Fax     [phone]

## 2023-11-07 NOTE — PROGRESS NOTES
Per physician please add on for 11/27 for final treatment.  Patient will continue labs day prior to treatment.  Follow-up 12/5.  Call back instructions reviewed.  Patient verbalized understanding.

## 2023-11-10 ENCOUNTER — LAB (OUTPATIENT)
Dept: LAB | Facility: CLINIC | Age: 73
End: 2023-11-10
Payer: MEDICARE

## 2023-11-10 ENCOUNTER — APPOINTMENT (OUTPATIENT)
Dept: HEMATOLOGY/ONCOLOGY | Facility: CLINIC | Age: 73
End: 2023-11-10
Payer: MEDICARE

## 2023-11-10 DIAGNOSIS — C67.9 MALIGNANT NEOPLASM OF URINARY BLADDER, UNSPECIFIED SITE (MULTI): ICD-10-CM

## 2023-11-10 LAB
BASOPHILS # BLD AUTO: 0.01 X10*3/UL (ref 0–0.1)
BASOPHILS NFR BLD AUTO: 0.2 %
EOSINOPHIL # BLD AUTO: 0.01 X10*3/UL (ref 0–0.4)
EOSINOPHIL NFR BLD AUTO: 0.2 %
ERYTHROCYTE [DISTWIDTH] IN BLOOD BY AUTOMATED COUNT: 14.7 % (ref 11.5–14.5)
HCT VFR BLD AUTO: 32.1 % (ref 41–52)
HGB BLD-MCNC: 11 G/DL (ref 13.5–17.5)
IMM GRANULOCYTES # BLD AUTO: 0.03 X10*3/UL (ref 0–0.5)
IMM GRANULOCYTES NFR BLD AUTO: 0.7 % (ref 0–0.9)
LYMPHOCYTES # BLD AUTO: 0.35 X10*3/UL (ref 0.8–3)
LYMPHOCYTES NFR BLD AUTO: 7.6 %
MCH RBC QN AUTO: 31.3 PG (ref 26–34)
MCHC RBC AUTO-ENTMCNC: 34.3 G/DL (ref 32–36)
MCV RBC AUTO: 91 FL (ref 80–100)
MONOCYTES # BLD AUTO: 0.33 X10*3/UL (ref 0.05–0.8)
MONOCYTES NFR BLD AUTO: 7.2 %
NEUTROPHILS # BLD AUTO: 3.87 X10*3/UL (ref 1.6–5.5)
NEUTROPHILS NFR BLD AUTO: 84.1 %
NRBC BLD-RTO: ABNORMAL /100{WBCS}
PLATELET # BLD AUTO: 146 X10*3/UL (ref 150–450)
RBC # BLD AUTO: 3.52 X10*6/UL (ref 4.5–5.9)
WBC # BLD AUTO: 4.6 X10*3/UL (ref 4.4–11.3)

## 2023-11-10 PROCEDURE — 36415 COLL VENOUS BLD VENIPUNCTURE: CPT

## 2023-11-10 PROCEDURE — 83735 ASSAY OF MAGNESIUM: CPT

## 2023-11-10 PROCEDURE — 80053 COMPREHEN METABOLIC PANEL: CPT

## 2023-11-10 PROCEDURE — 85025 COMPLETE CBC W/AUTO DIFF WBC: CPT

## 2023-11-11 LAB
ALBUMIN SERPL BCP-MCNC: 3.9 G/DL (ref 3.4–5)
ALP SERPL-CCNC: 94 U/L (ref 33–136)
ALT SERPL W P-5'-P-CCNC: 22 U/L (ref 10–52)
ANION GAP SERPL CALC-SCNC: 19 MMOL/L (ref 10–20)
AST SERPL W P-5'-P-CCNC: 16 U/L (ref 9–39)
BILIRUB SERPL-MCNC: 0.4 MG/DL (ref 0–1.2)
BUN SERPL-MCNC: 41 MG/DL (ref 6–23)
CALCIUM SERPL-MCNC: 9.1 MG/DL (ref 8.6–10.6)
CHLORIDE SERPL-SCNC: 98 MMOL/L (ref 98–107)
CO2 SERPL-SCNC: 22 MMOL/L (ref 21–32)
CREAT SERPL-MCNC: 1.55 MG/DL (ref 0.5–1.3)
GFR SERPL CREATININE-BSD FRML MDRD: 47 ML/MIN/1.73M*2
GLUCOSE SERPL-MCNC: 249 MG/DL (ref 74–99)
MAGNESIUM SERPL-MCNC: 1.48 MG/DL (ref 1.6–2.4)
MAGNESIUM SERPL-MCNC: 1.52 MG/DL (ref 1.6–2.4)
POTASSIUM SERPL-SCNC: 5 MMOL/L (ref 3.5–5.3)
PROT SERPL-MCNC: 6 G/DL (ref 6.4–8.2)
SODIUM SERPL-SCNC: 134 MMOL/L (ref 136–145)

## 2023-11-13 ENCOUNTER — INFUSION (OUTPATIENT)
Dept: HEMATOLOGY/ONCOLOGY | Facility: CLINIC | Age: 73
End: 2023-11-13
Payer: MEDICARE

## 2023-11-13 VITALS
SYSTOLIC BLOOD PRESSURE: 114 MMHG | OXYGEN SATURATION: 98 % | HEART RATE: 83 BPM | BODY MASS INDEX: 36.5 KG/M2 | RESPIRATION RATE: 20 BRPM | DIASTOLIC BLOOD PRESSURE: 72 MMHG | TEMPERATURE: 97.2 F | WEIGHT: 212.74 LBS

## 2023-11-13 DIAGNOSIS — C67.9 MALIGNANT NEOPLASM OF URINARY BLADDER, UNSPECIFIED SITE (MULTI): ICD-10-CM

## 2023-11-13 PROCEDURE — 2500000001 HC RX 250 WO HCPCS SELF ADMINISTERED DRUGS (ALT 637 FOR MEDICARE OP): Performed by: INTERNAL MEDICINE

## 2023-11-13 PROCEDURE — 96366 THER/PROPH/DIAG IV INF ADDON: CPT | Mod: INF

## 2023-11-13 PROCEDURE — 96375 TX/PRO/DX INJ NEW DRUG ADDON: CPT | Mod: INF

## 2023-11-13 PROCEDURE — 96413 CHEMO IV INFUSION 1 HR: CPT

## 2023-11-13 PROCEDURE — 2500000004 HC RX 250 GENERAL PHARMACY W/ HCPCS (ALT 636 FOR OP/ED): Performed by: INTERNAL MEDICINE

## 2023-11-13 PROCEDURE — 96368 THER/DIAG CONCURRENT INF: CPT

## 2023-11-13 PROCEDURE — 96367 TX/PROPH/DG ADDL SEQ IV INF: CPT

## 2023-11-13 PROCEDURE — 96417 CHEMO IV INFUS EACH ADDL SEQ: CPT

## 2023-11-13 RX ORDER — FAMOTIDINE 10 MG/ML
20 INJECTION INTRAVENOUS ONCE
Status: COMPLETED | OUTPATIENT
Start: 2023-11-13 | End: 2023-11-13

## 2023-11-13 RX ORDER — PALONOSETRON 0.05 MG/ML
0.25 INJECTION, SOLUTION INTRAVENOUS ONCE
Status: COMPLETED | OUTPATIENT
Start: 2023-11-13 | End: 2023-11-13

## 2023-11-13 RX ORDER — HEPARIN SODIUM,PORCINE/PF 10 UNIT/ML
50 SYRINGE (ML) INTRAVENOUS AS NEEDED
Status: CANCELLED | OUTPATIENT
Start: 2023-11-13

## 2023-11-13 RX ORDER — DIPHENHYDRAMINE HCL 50 MG
50 CAPSULE ORAL ONCE
Status: COMPLETED | OUTPATIENT
Start: 2023-11-13 | End: 2023-11-13

## 2023-11-13 RX ORDER — FAMOTIDINE 10 MG/ML
20 INJECTION INTRAVENOUS ONCE AS NEEDED
Status: DISCONTINUED | OUTPATIENT
Start: 2023-11-13 | End: 2023-11-13 | Stop reason: HOSPADM

## 2023-11-13 RX ORDER — HEPARIN 100 UNIT/ML
500 SYRINGE INTRAVENOUS AS NEEDED
Status: CANCELLED | OUTPATIENT
Start: 2023-11-13

## 2023-11-13 RX ORDER — PROCHLORPERAZINE EDISYLATE 5 MG/ML
10 INJECTION INTRAMUSCULAR; INTRAVENOUS EVERY 6 HOURS PRN
Status: DISCONTINUED | OUTPATIENT
Start: 2023-11-13 | End: 2023-11-13 | Stop reason: HOSPADM

## 2023-11-13 RX ORDER — DIPHENHYDRAMINE HYDROCHLORIDE 50 MG/ML
50 INJECTION INTRAMUSCULAR; INTRAVENOUS AS NEEDED
Status: DISCONTINUED | OUTPATIENT
Start: 2023-11-13 | End: 2023-11-13 | Stop reason: HOSPADM

## 2023-11-13 RX ORDER — PROCHLORPERAZINE MALEATE 10 MG
10 TABLET ORAL EVERY 6 HOURS PRN
Status: DISCONTINUED | OUTPATIENT
Start: 2023-11-13 | End: 2023-11-13 | Stop reason: HOSPADM

## 2023-11-13 RX ORDER — EPINEPHRINE 0.3 MG/.3ML
0.3 INJECTION SUBCUTANEOUS EVERY 5 MIN PRN
Status: DISCONTINUED | OUTPATIENT
Start: 2023-11-13 | End: 2023-11-13 | Stop reason: HOSPADM

## 2023-11-13 RX ORDER — ALBUTEROL SULFATE 0.83 MG/ML
3 SOLUTION RESPIRATORY (INHALATION) AS NEEDED
Status: DISCONTINUED | OUTPATIENT
Start: 2023-11-13 | End: 2023-11-13 | Stop reason: HOSPADM

## 2023-11-13 RX ADMIN — PALONOSETRON 250 MCG: 0.05 INJECTION, SOLUTION INTRAVENOUS at 12:00

## 2023-11-13 RX ADMIN — DIPHENHYDRAMINE HYDROCHLORIDE 50 MG: 50 CAPSULE ORAL at 12:00

## 2023-11-13 RX ADMIN — POTASSIUM CHLORIDE 500 ML/HR: 2 INJECTION, SOLUTION, CONCENTRATE INTRAVENOUS at 14:09

## 2023-11-13 RX ADMIN — FAMOTIDINE 20 MG: 10 INJECTION, SOLUTION INTRAVENOUS at 12:00

## 2023-11-13 RX ADMIN — CISPLATIN 42 MG: 1 INJECTION, SOLUTION INTRAVENOUS at 12:56

## 2023-11-13 RX ADMIN — DEXAMETHASONE SODIUM PHOSPHATE 20 MG: 4 INJECTION, SOLUTION INTRA-ARTICULAR; INTRALESIONAL; INTRAMUSCULAR; INTRAVENOUS; SOFT TISSUE at 12:00

## 2023-11-13 RX ADMIN — POTASSIUM CHLORIDE 500 ML/HR: 2 INJECTION, SOLUTION, CONCENTRATE INTRAVENOUS at 10:52

## 2023-11-13 RX ADMIN — FOSAPREPITANT 150 MG: 150 INJECTION, POWDER, LYOPHILIZED, FOR SOLUTION INTRAVENOUS at 12:21

## 2023-11-13 RX ADMIN — PACLITAXEL 105 MG: 6 INJECTION, SOLUTION INTRAVENOUS at 14:09

## 2023-11-13 ASSESSMENT — PAIN SCALES - GENERAL: PAINLEVEL: 0-NO PAIN

## 2023-11-13 NOTE — PROGRESS NOTES
Patient is here today for infusion - no complication since last being seen.  Independent double check done prior to chemotherapy today.  B/L lung sounds not auscultated.  Denies current chest pain. No acute distress noted.  Notified Dr. Jarquin for low creatinine clearance - okay to treat.  Patient verbalizes understanding of plan of care.  Patient tolerated infusion well.  Ambulated off unit - gait steady.  no complaints. Call back instructions reviewed.  Verbalized understanding.

## 2023-11-13 NOTE — SIGNIFICANT EVENT
11/13/23 0953   Prechemo Checklist   Has the patient been in the hospital, ED, or urgent care since last date of service No   Chemo/Immuno Consent Signed Yes   Protocol/Indications Verified Yes   Confirmed to previous date/time of medication Yes   Compared to previous dose Yes   All medications are dated accurately Yes   Pregnancy Test Negative Not applicable   Parameters Met (!) No   Provider Notified Yes   Provider Name Dr. Jarquin   Is Patient Proceeding With Treatment? Yes   BSA/Weight-Height Verified Yes   Dose Calculations Verified Yes

## 2023-11-14 ENCOUNTER — TELEPHONE (OUTPATIENT)
Dept: HEMATOLOGY/ONCOLOGY | Facility: CLINIC | Age: 73
End: 2023-11-14
Payer: MEDICARE

## 2023-11-14 NOTE — TELEPHONE ENCOUNTER
Patient wanted office to know that he switched from magnesium oxide to magnesium glycinate after speaking with pharmacist yesterday.  He told him the would get diarrhea and it would absorb better.

## 2023-11-17 ENCOUNTER — LAB (OUTPATIENT)
Dept: LAB | Facility: CLINIC | Age: 73
End: 2023-11-17
Payer: MEDICARE

## 2023-11-17 ENCOUNTER — TELEPHONE (OUTPATIENT)
Dept: HEMATOLOGY/ONCOLOGY | Facility: CLINIC | Age: 73
End: 2023-11-17
Payer: MEDICARE

## 2023-11-17 DIAGNOSIS — C67.9 MALIGNANT NEOPLASM OF URINARY BLADDER, UNSPECIFIED SITE (MULTI): ICD-10-CM

## 2023-11-17 LAB
BASOPHILS # BLD AUTO: 0.02 X10*3/UL (ref 0–0.1)
BASOPHILS NFR BLD AUTO: 0.4 %
EOSINOPHIL # BLD AUTO: 0.02 X10*3/UL (ref 0–0.4)
EOSINOPHIL NFR BLD AUTO: 0.4 %
ERYTHROCYTE [DISTWIDTH] IN BLOOD BY AUTOMATED COUNT: 15.6 % (ref 11.5–14.5)
HCT VFR BLD AUTO: 33.5 % (ref 41–52)
HGB BLD-MCNC: 11.2 G/DL (ref 13.5–17.5)
IMM GRANULOCYTES # BLD AUTO: 0.02 X10*3/UL (ref 0–0.5)
IMM GRANULOCYTES NFR BLD AUTO: 0.4 % (ref 0–0.9)
LYMPHOCYTES # BLD AUTO: 0.49 X10*3/UL (ref 0.8–3)
LYMPHOCYTES NFR BLD AUTO: 10.4 %
MCH RBC QN AUTO: 31.6 PG (ref 26–34)
MCHC RBC AUTO-ENTMCNC: 33.4 G/DL (ref 32–36)
MCV RBC AUTO: 95 FL (ref 80–100)
MONOCYTES # BLD AUTO: 0.34 X10*3/UL (ref 0.05–0.8)
MONOCYTES NFR BLD AUTO: 7.2 %
NEUTROPHILS # BLD AUTO: 3.8 X10*3/UL (ref 1.6–5.5)
NEUTROPHILS NFR BLD AUTO: 81.2 %
NRBC BLD-RTO: ABNORMAL /100{WBCS}
PLATELET # BLD AUTO: 130 X10*3/UL (ref 150–450)
RBC # BLD AUTO: 3.54 X10*6/UL (ref 4.5–5.9)
WBC # BLD AUTO: 4.7 X10*3/UL (ref 4.4–11.3)

## 2023-11-17 PROCEDURE — 36415 COLL VENOUS BLD VENIPUNCTURE: CPT

## 2023-11-17 PROCEDURE — 85025 COMPLETE CBC W/AUTO DIFF WBC: CPT

## 2023-11-17 PROCEDURE — 80053 COMPREHEN METABOLIC PANEL: CPT

## 2023-11-17 PROCEDURE — 83735 ASSAY OF MAGNESIUM: CPT

## 2023-11-17 NOTE — TELEPHONE ENCOUNTER
"Patient called inquiring about taking Tylenol.  Call returned to patient.  Inquired with patient what he needs to take Tylenol for.  Patient states the arm he received his chemotherapy in is sore and \"swollen\".  States he \"Cannot lift that arm\".  Patient denies redness or heat at site.  States it feels like a \"muscle ache\".  Notified patient he can take Tylenol however concerned that he could have blood clot or phlebitis.  Teaching provided.  Discussed with Dr Jarquin.  Per provider patient to be evaluated for blood clot.  US of upper extremity to rule out blood clot.  Because it is late Friday suggested patient proceed to ER.  Patient agreeable and will go to [facility] today after RT.   Call back instructions reviewed.  Patient verbalized understanding.     "

## 2023-11-18 LAB
ALBUMIN SERPL BCP-MCNC: 4.1 G/DL (ref 3.4–5)
ALP SERPL-CCNC: 110 U/L (ref 33–136)
ALT SERPL W P-5'-P-CCNC: 22 U/L (ref 10–52)
ANION GAP SERPL CALC-SCNC: 13 MMOL/L (ref 10–20)
AST SERPL W P-5'-P-CCNC: 16 U/L (ref 9–39)
BILIRUB SERPL-MCNC: 0.4 MG/DL (ref 0–1.2)
BUN SERPL-MCNC: 41 MG/DL (ref 6–23)
CALCIUM SERPL-MCNC: 9.3 MG/DL (ref 8.6–10.6)
CHLORIDE SERPL-SCNC: 100 MMOL/L (ref 98–107)
CO2 SERPL-SCNC: 24 MMOL/L (ref 21–32)
CREAT SERPL-MCNC: 1.37 MG/DL (ref 0.5–1.3)
GFR SERPL CREATININE-BSD FRML MDRD: 55 ML/MIN/1.73M*2
GLUCOSE SERPL-MCNC: 153 MG/DL (ref 74–99)
MAGNESIUM SERPL-MCNC: 1.68 MG/DL (ref 1.6–2.4)
POTASSIUM SERPL-SCNC: 4.9 MMOL/L (ref 3.5–5.3)
PROT SERPL-MCNC: 6.3 G/DL (ref 6.4–8.2)
SODIUM SERPL-SCNC: 132 MMOL/L (ref 136–145)

## 2023-11-20 ENCOUNTER — INFUSION (OUTPATIENT)
Dept: HEMATOLOGY/ONCOLOGY | Facility: CLINIC | Age: 73
End: 2023-11-20
Payer: MEDICARE

## 2023-11-20 VITALS
BODY MASS INDEX: 37.05 KG/M2 | OXYGEN SATURATION: 100 % | HEART RATE: 68 BPM | RESPIRATION RATE: 18 BRPM | DIASTOLIC BLOOD PRESSURE: 79 MMHG | TEMPERATURE: 97 F | SYSTOLIC BLOOD PRESSURE: 134 MMHG | WEIGHT: 215.94 LBS

## 2023-11-20 DIAGNOSIS — C67.3 MALIGNANT NEOPLASM OF ANTERIOR WALL OF URINARY BLADDER (MULTI): ICD-10-CM

## 2023-11-20 DIAGNOSIS — C67.9 MALIGNANT NEOPLASM OF URINARY BLADDER, UNSPECIFIED SITE (MULTI): ICD-10-CM

## 2023-11-20 LAB
BASOPHILS # BLD AUTO: 0.02 X10*3/UL (ref 0–0.1)
BASOPHILS NFR BLD AUTO: 0.7 %
EOSINOPHIL # BLD AUTO: 0.02 X10*3/UL (ref 0–0.4)
EOSINOPHIL NFR BLD AUTO: 0.7 %
ERYTHROCYTE [DISTWIDTH] IN BLOOD BY AUTOMATED COUNT: 15.8 % (ref 11.5–14.5)
HCT VFR BLD AUTO: 25.8 % (ref 41–52)
HGB BLD-MCNC: 8.5 G/DL (ref 13.5–17.5)
IMM GRANULOCYTES # BLD AUTO: 0.06 X10*3/UL (ref 0–0.5)
IMM GRANULOCYTES NFR BLD AUTO: 2.1 % (ref 0–0.9)
LYMPHOCYTES # BLD AUTO: 0.32 X10*3/UL (ref 0.8–3)
LYMPHOCYTES NFR BLD AUTO: 11.1 %
MCH RBC QN AUTO: 31.3 PG (ref 26–34)
MCHC RBC AUTO-ENTMCNC: 32.9 G/DL (ref 32–36)
MCV RBC AUTO: 95 FL (ref 80–100)
MONOCYTES # BLD AUTO: 0.24 X10*3/UL (ref 0.05–0.8)
MONOCYTES NFR BLD AUTO: 8.4 %
NEUTROPHILS # BLD AUTO: 2.21 X10*3/UL (ref 1.6–5.5)
NEUTROPHILS NFR BLD AUTO: 77 %
NRBC BLD-RTO: ABNORMAL /100{WBCS}
PLATELET # BLD AUTO: 123 X10*3/UL (ref 150–450)
RBC # BLD AUTO: 2.72 X10*6/UL (ref 4.5–5.9)
WBC # BLD AUTO: 2.9 X10*3/UL (ref 4.4–11.3)

## 2023-11-20 PROCEDURE — 96413 CHEMO IV INFUSION 1 HR: CPT

## 2023-11-20 PROCEDURE — 96360 HYDRATION IV INFUSION INIT: CPT | Mod: INF

## 2023-11-20 PROCEDURE — 96366 THER/PROPH/DIAG IV INF ADDON: CPT | Mod: INF

## 2023-11-20 PROCEDURE — 96361 HYDRATE IV INFUSION ADD-ON: CPT | Mod: INF

## 2023-11-20 PROCEDURE — 96365 THER/PROPH/DIAG IV INF INIT: CPT | Mod: INF

## 2023-11-20 PROCEDURE — 96368 THER/DIAG CONCURRENT INF: CPT

## 2023-11-20 PROCEDURE — 96417 CHEMO IV INFUS EACH ADDL SEQ: CPT

## 2023-11-20 PROCEDURE — 2500000001 HC RX 250 WO HCPCS SELF ADMINISTERED DRUGS (ALT 637 FOR MEDICARE OP): Performed by: INTERNAL MEDICINE

## 2023-11-20 PROCEDURE — 96374 THER/PROPH/DIAG INJ IV PUSH: CPT | Mod: INF

## 2023-11-20 PROCEDURE — 96375 TX/PRO/DX INJ NEW DRUG ADDON: CPT | Mod: INF

## 2023-11-20 PROCEDURE — 2500000004 HC RX 250 GENERAL PHARMACY W/ HCPCS (ALT 636 FOR OP/ED): Performed by: INTERNAL MEDICINE

## 2023-11-20 PROCEDURE — 85025 COMPLETE CBC W/AUTO DIFF WBC: CPT

## 2023-11-20 PROCEDURE — 96367 TX/PROPH/DG ADDL SEQ IV INF: CPT

## 2023-11-20 PROCEDURE — 36415 COLL VENOUS BLD VENIPUNCTURE: CPT

## 2023-11-20 RX ORDER — PROCHLORPERAZINE EDISYLATE 5 MG/ML
10 INJECTION INTRAMUSCULAR; INTRAVENOUS EVERY 6 HOURS PRN
Status: DISCONTINUED | OUTPATIENT
Start: 2023-11-20 | End: 2023-11-20 | Stop reason: HOSPADM

## 2023-11-20 RX ORDER — FAMOTIDINE 10 MG/ML
20 INJECTION INTRAVENOUS ONCE AS NEEDED
Status: DISCONTINUED | OUTPATIENT
Start: 2023-11-20 | End: 2023-11-20 | Stop reason: HOSPADM

## 2023-11-20 RX ORDER — HEPARIN 100 UNIT/ML
500 SYRINGE INTRAVENOUS AS NEEDED
Status: CANCELLED | OUTPATIENT
Start: 2023-11-20

## 2023-11-20 RX ORDER — PALONOSETRON 0.05 MG/ML
0.25 INJECTION, SOLUTION INTRAVENOUS ONCE
Status: COMPLETED | OUTPATIENT
Start: 2023-11-20 | End: 2023-11-20

## 2023-11-20 RX ORDER — DIPHENHYDRAMINE HCL 50 MG
50 CAPSULE ORAL ONCE
Status: COMPLETED | OUTPATIENT
Start: 2023-11-20 | End: 2023-11-20

## 2023-11-20 RX ORDER — FAMOTIDINE 10 MG/ML
20 INJECTION INTRAVENOUS ONCE
Status: COMPLETED | OUTPATIENT
Start: 2023-11-20 | End: 2023-11-20

## 2023-11-20 RX ORDER — PROCHLORPERAZINE MALEATE 10 MG
10 TABLET ORAL EVERY 6 HOURS PRN
Status: DISCONTINUED | OUTPATIENT
Start: 2023-11-20 | End: 2023-11-20 | Stop reason: HOSPADM

## 2023-11-20 RX ORDER — HEPARIN SODIUM,PORCINE/PF 10 UNIT/ML
50 SYRINGE (ML) INTRAVENOUS AS NEEDED
Status: CANCELLED | OUTPATIENT
Start: 2023-11-20

## 2023-11-20 RX ORDER — ALBUTEROL SULFATE 0.83 MG/ML
3 SOLUTION RESPIRATORY (INHALATION) AS NEEDED
Status: DISCONTINUED | OUTPATIENT
Start: 2023-11-20 | End: 2023-11-20 | Stop reason: HOSPADM

## 2023-11-20 RX ORDER — MAGNESIUM GLUCONATE 27 MG(500)
27 TABLET ORAL 2 TIMES DAILY
COMMUNITY

## 2023-11-20 RX ORDER — EPINEPHRINE 0.3 MG/.3ML
0.3 INJECTION SUBCUTANEOUS EVERY 5 MIN PRN
Status: DISCONTINUED | OUTPATIENT
Start: 2023-11-20 | End: 2023-11-20 | Stop reason: HOSPADM

## 2023-11-20 RX ORDER — DIPHENHYDRAMINE HYDROCHLORIDE 50 MG/ML
50 INJECTION INTRAMUSCULAR; INTRAVENOUS AS NEEDED
Status: DISCONTINUED | OUTPATIENT
Start: 2023-11-20 | End: 2023-11-20 | Stop reason: HOSPADM

## 2023-11-20 RX ADMIN — PACLITAXEL 105 MG: 6 INJECTION, SOLUTION INTRAVENOUS at 13:32

## 2023-11-20 RX ADMIN — POTASSIUM CHLORIDE 500 ML/HR: 2 INJECTION, SOLUTION, CONCENTRATE INTRAVENOUS at 13:45

## 2023-11-20 RX ADMIN — CISPLATIN 42 MG: 1 INJECTION INTRAVENOUS at 12:26

## 2023-11-20 RX ADMIN — FOSAPREPITANT 150 MG: 150 INJECTION, POWDER, LYOPHILIZED, FOR SOLUTION INTRAVENOUS at 11:00

## 2023-11-20 RX ADMIN — DIPHENHYDRAMINE HYDROCHLORIDE 50 MG: 50 CAPSULE ORAL at 10:59

## 2023-11-20 RX ADMIN — PALONOSETRON 250 MCG: 0.05 INJECTION, SOLUTION INTRAVENOUS at 10:59

## 2023-11-20 RX ADMIN — DEXAMETHASONE SODIUM PHOSPHATE 20 MG: 4 INJECTION, SOLUTION INTRA-ARTICULAR; INTRALESIONAL; INTRAMUSCULAR; INTRAVENOUS; SOFT TISSUE at 10:54

## 2023-11-20 RX ADMIN — FAMOTIDINE 20 MG: 10 INJECTION, SOLUTION INTRAVENOUS at 10:55

## 2023-11-20 RX ADMIN — POTASSIUM CHLORIDE 500 ML/HR: 2 INJECTION, SOLUTION, CONCENTRATE INTRAVENOUS at 10:55

## 2023-11-20 ASSESSMENT — PAIN SCALES - GENERAL: PAINLEVEL: 5

## 2023-11-20 NOTE — SIGNIFICANT EVENT
11/20/23 1033   Prechemo Checklist   Has the patient been in the hospital, ED, or urgent care since last date of service Yes  (provider aware)   Chemo/Immuno Consent Signed Yes   Protocol/Indications Verified Yes   Confirmed to previous date/time of medication Yes   Compared to previous dose Yes   All medications are dated accurately Yes   Pregnancy Test Negative Not applicable   Parameters Met Yes  (Cr Cl parameter removed)   Provider Notified Yes   Is Patient Proceeding With Treatment? Yes   Dose Calculations Verified Yes

## 2023-11-20 NOTE — TELEPHONE ENCOUNTER
"Report received from [facility] ED report.  States \"positive for SVT in the right basillic vein and right cephalic vein.  Per note case was discussed with Dr Diego gonzales for the [facility].  States she agrees patient is high risk and should be seen for close follow-up and CBC.  Patient was started on Eliquis.  Discussed with Dr Jarquin by phone.  Per Dr Jarquin OK to proceed with treatment as ordered today.  Will continue on anticoagulation for now. Treatment nurse notified.    "

## 2023-11-24 ENCOUNTER — LAB (OUTPATIENT)
Dept: LAB | Facility: CLINIC | Age: 73
End: 2023-11-24
Payer: MEDICARE

## 2023-11-24 DIAGNOSIS — C67.9 MALIGNANT NEOPLASM OF URINARY BLADDER, UNSPECIFIED SITE (MULTI): ICD-10-CM

## 2023-11-24 LAB
BASOPHILS # BLD AUTO: 0.01 X10*3/UL (ref 0–0.1)
BASOPHILS NFR BLD AUTO: 0.3 %
EOSINOPHIL # BLD AUTO: 0.02 X10*3/UL (ref 0–0.4)
EOSINOPHIL NFR BLD AUTO: 0.7 %
ERYTHROCYTE [DISTWIDTH] IN BLOOD BY AUTOMATED COUNT: 16.8 % (ref 11.5–14.5)
HCT VFR BLD AUTO: 31 % (ref 41–52)
HGB BLD-MCNC: 10.3 G/DL (ref 13.5–17.5)
IMM GRANULOCYTES # BLD AUTO: 0.01 X10*3/UL (ref 0–0.5)
IMM GRANULOCYTES NFR BLD AUTO: 0.3 % (ref 0–0.9)
LYMPHOCYTES # BLD AUTO: 0.38 X10*3/UL (ref 0.8–3)
LYMPHOCYTES NFR BLD AUTO: 12.6 %
MCH RBC QN AUTO: 31.8 PG (ref 26–34)
MCHC RBC AUTO-ENTMCNC: 33.2 G/DL (ref 32–36)
MCV RBC AUTO: 96 FL (ref 80–100)
MONOCYTES # BLD AUTO: 0.2 X10*3/UL (ref 0.05–0.8)
MONOCYTES NFR BLD AUTO: 6.6 %
NEUTROPHILS # BLD AUTO: 2.4 X10*3/UL (ref 1.6–5.5)
NEUTROPHILS NFR BLD AUTO: 79.5 %
NRBC BLD-RTO: ABNORMAL /100{WBCS}
PLATELET # BLD AUTO: 161 X10*3/UL (ref 150–450)
RBC # BLD AUTO: 3.24 X10*6/UL (ref 4.5–5.9)
WBC # BLD AUTO: 3 X10*3/UL (ref 4.4–11.3)

## 2023-11-24 PROCEDURE — 82374 ASSAY BLOOD CARBON DIOXIDE: CPT

## 2023-11-24 PROCEDURE — 85025 COMPLETE CBC W/AUTO DIFF WBC: CPT

## 2023-11-24 PROCEDURE — 36415 COLL VENOUS BLD VENIPUNCTURE: CPT

## 2023-11-25 LAB
ALBUMIN SERPL BCP-MCNC: 4 G/DL (ref 3.4–5)
ALP SERPL-CCNC: 85 U/L (ref 33–136)
ALT SERPL W P-5'-P-CCNC: 22 U/L (ref 10–52)
ANION GAP SERPL CALC-SCNC: 16 MMOL/L (ref 10–20)
AST SERPL W P-5'-P-CCNC: 18 U/L (ref 9–39)
BILIRUB SERPL-MCNC: 0.4 MG/DL (ref 0–1.2)
BUN SERPL-MCNC: 36 MG/DL (ref 6–23)
CALCIUM SERPL-MCNC: 9.1 MG/DL (ref 8.6–10.6)
CHLORIDE SERPL-SCNC: 101 MMOL/L (ref 98–107)
CO2 SERPL-SCNC: 22 MMOL/L (ref 21–32)
CREAT SERPL-MCNC: 1.43 MG/DL (ref 0.5–1.3)
GFR SERPL CREATININE-BSD FRML MDRD: 52 ML/MIN/1.73M*2
GLUCOSE SERPL-MCNC: 125 MG/DL (ref 74–99)
POTASSIUM SERPL-SCNC: 4.8 MMOL/L (ref 3.5–5.3)
PROT SERPL-MCNC: 6.4 G/DL (ref 6.4–8.2)
SODIUM SERPL-SCNC: 134 MMOL/L (ref 136–145)

## 2023-11-27 ENCOUNTER — INFUSION (OUTPATIENT)
Dept: HEMATOLOGY/ONCOLOGY | Facility: CLINIC | Age: 73
End: 2023-11-27
Payer: MEDICARE

## 2023-11-27 VITALS
BODY MASS INDEX: 36.92 KG/M2 | TEMPERATURE: 97.3 F | OXYGEN SATURATION: 99 % | RESPIRATION RATE: 18 BRPM | WEIGHT: 215.17 LBS | DIASTOLIC BLOOD PRESSURE: 75 MMHG | HEART RATE: 84 BPM | SYSTOLIC BLOOD PRESSURE: 135 MMHG

## 2023-11-27 DIAGNOSIS — C67.9 MALIGNANT NEOPLASM OF URINARY BLADDER, UNSPECIFIED SITE (MULTI): ICD-10-CM

## 2023-11-27 DIAGNOSIS — C67.9 MALIGNANT NEOPLASM OF URINARY BLADDER, UNSPECIFIED SITE (MULTI): Primary | ICD-10-CM

## 2023-11-27 LAB
BASOPHILS # BLD AUTO: 0.01 X10*3/UL (ref 0–0.1)
BASOPHILS NFR BLD AUTO: 0.4 %
EOSINOPHIL # BLD AUTO: 0.02 X10*3/UL (ref 0–0.4)
EOSINOPHIL NFR BLD AUTO: 0.8 %
ERYTHROCYTE [DISTWIDTH] IN BLOOD BY AUTOMATED COUNT: 17.1 % (ref 11.5–14.5)
HCT VFR BLD AUTO: 25 % (ref 41–52)
HGB BLD-MCNC: 8.4 G/DL (ref 13.5–17.5)
IMM GRANULOCYTES # BLD AUTO: 0.05 X10*3/UL (ref 0–0.5)
IMM GRANULOCYTES NFR BLD AUTO: 2 % (ref 0–0.9)
LYMPHOCYTES # BLD AUTO: 0.27 X10*3/UL (ref 0.8–3)
LYMPHOCYTES NFR BLD AUTO: 11.1 %
MCH RBC QN AUTO: 32.1 PG (ref 26–34)
MCHC RBC AUTO-ENTMCNC: 33.6 G/DL (ref 32–36)
MCV RBC AUTO: 95 FL (ref 80–100)
MONOCYTES # BLD AUTO: 0.27 X10*3/UL (ref 0.05–0.8)
MONOCYTES NFR BLD AUTO: 11.1 %
NEUTROPHILS # BLD AUTO: 1.82 X10*3/UL (ref 1.6–5.5)
NEUTROPHILS NFR BLD AUTO: 74.6 %
NRBC BLD-RTO: ABNORMAL /100{WBCS}
PLATELET # BLD AUTO: 159 X10*3/UL (ref 150–450)
RBC # BLD AUTO: 2.62 X10*6/UL (ref 4.5–5.9)
WBC # BLD AUTO: 2.4 X10*3/UL (ref 4.4–11.3)

## 2023-11-27 PROCEDURE — 96366 THER/PROPH/DIAG IV INF ADDON: CPT | Mod: INF

## 2023-11-27 PROCEDURE — 96413 CHEMO IV INFUSION 1 HR: CPT

## 2023-11-27 PROCEDURE — 83550 IRON BINDING TEST: CPT

## 2023-11-27 PROCEDURE — 2500000004 HC RX 250 GENERAL PHARMACY W/ HCPCS (ALT 636 FOR OP/ED): Performed by: INTERNAL MEDICINE

## 2023-11-27 PROCEDURE — 82728 ASSAY OF FERRITIN: CPT

## 2023-11-27 PROCEDURE — 2500000001 HC RX 250 WO HCPCS SELF ADMINISTERED DRUGS (ALT 637 FOR MEDICARE OP): Performed by: INTERNAL MEDICINE

## 2023-11-27 PROCEDURE — 36415 COLL VENOUS BLD VENIPUNCTURE: CPT

## 2023-11-27 PROCEDURE — 80053 COMPREHEN METABOLIC PANEL: CPT

## 2023-11-27 PROCEDURE — 96367 TX/PROPH/DG ADDL SEQ IV INF: CPT

## 2023-11-27 PROCEDURE — 83735 ASSAY OF MAGNESIUM: CPT

## 2023-11-27 PROCEDURE — 96368 THER/DIAG CONCURRENT INF: CPT

## 2023-11-27 PROCEDURE — 85025 COMPLETE CBC W/AUTO DIFF WBC: CPT

## 2023-11-27 PROCEDURE — 96375 TX/PRO/DX INJ NEW DRUG ADDON: CPT | Mod: INF

## 2023-11-27 PROCEDURE — 96417 CHEMO IV INFUS EACH ADDL SEQ: CPT

## 2023-11-27 RX ORDER — FAMOTIDINE 10 MG/ML
20 INJECTION INTRAVENOUS ONCE
Status: CANCELLED | OUTPATIENT
Start: 2023-11-27 | End: 2023-11-27

## 2023-11-27 RX ORDER — HEPARIN SODIUM,PORCINE/PF 10 UNIT/ML
50 SYRINGE (ML) INTRAVENOUS AS NEEDED
Status: CANCELLED | OUTPATIENT
Start: 2023-11-27

## 2023-11-27 RX ORDER — PALONOSETRON 0.05 MG/ML
0.25 INJECTION, SOLUTION INTRAVENOUS ONCE
Status: COMPLETED | OUTPATIENT
Start: 2023-11-27 | End: 2023-11-27

## 2023-11-27 RX ORDER — FAMOTIDINE 10 MG/ML
20 INJECTION INTRAVENOUS ONCE AS NEEDED
Status: CANCELLED | OUTPATIENT
Start: 2023-11-27

## 2023-11-27 RX ORDER — EPINEPHRINE 0.3 MG/.3ML
0.3 INJECTION SUBCUTANEOUS EVERY 5 MIN PRN
Status: DISCONTINUED | OUTPATIENT
Start: 2023-11-27 | End: 2023-11-27 | Stop reason: HOSPADM

## 2023-11-27 RX ORDER — DIPHENHYDRAMINE HCL 50 MG
50 CAPSULE ORAL ONCE
Status: COMPLETED | OUTPATIENT
Start: 2023-11-27 | End: 2023-11-27

## 2023-11-27 RX ORDER — PROCHLORPERAZINE EDISYLATE 5 MG/ML
10 INJECTION INTRAMUSCULAR; INTRAVENOUS EVERY 6 HOURS PRN
Status: DISCONTINUED | OUTPATIENT
Start: 2023-11-27 | End: 2023-11-27 | Stop reason: HOSPADM

## 2023-11-27 RX ORDER — ALBUTEROL SULFATE 0.83 MG/ML
3 SOLUTION RESPIRATORY (INHALATION) AS NEEDED
Status: CANCELLED | OUTPATIENT
Start: 2023-11-27

## 2023-11-27 RX ORDER — DIPHENHYDRAMINE HYDROCHLORIDE 50 MG/ML
50 INJECTION INTRAMUSCULAR; INTRAVENOUS AS NEEDED
Status: DISCONTINUED | OUTPATIENT
Start: 2023-11-27 | End: 2023-11-27 | Stop reason: HOSPADM

## 2023-11-27 RX ORDER — HEPARIN 100 UNIT/ML
500 SYRINGE INTRAVENOUS AS NEEDED
Status: CANCELLED | OUTPATIENT
Start: 2023-11-27

## 2023-11-27 RX ORDER — EPINEPHRINE 0.3 MG/.3ML
0.3 INJECTION SUBCUTANEOUS EVERY 5 MIN PRN
Status: CANCELLED | OUTPATIENT
Start: 2023-11-27

## 2023-11-27 RX ORDER — FAMOTIDINE 10 MG/ML
20 INJECTION INTRAVENOUS ONCE AS NEEDED
Status: DISCONTINUED | OUTPATIENT
Start: 2023-11-27 | End: 2023-11-27 | Stop reason: HOSPADM

## 2023-11-27 RX ORDER — PALONOSETRON 0.05 MG/ML
0.25 INJECTION, SOLUTION INTRAVENOUS ONCE
Status: CANCELLED | OUTPATIENT
Start: 2023-11-27

## 2023-11-27 RX ORDER — PROCHLORPERAZINE MALEATE 5 MG
10 TABLET ORAL EVERY 6 HOURS PRN
Status: CANCELLED | OUTPATIENT
Start: 2023-11-27

## 2023-11-27 RX ORDER — PROCHLORPERAZINE EDISYLATE 5 MG/ML
10 INJECTION INTRAMUSCULAR; INTRAVENOUS EVERY 6 HOURS PRN
Status: CANCELLED | OUTPATIENT
Start: 2023-11-27

## 2023-11-27 RX ORDER — DIPHENHYDRAMINE HCL 50 MG
50 CAPSULE ORAL ONCE
Status: CANCELLED | OUTPATIENT
Start: 2023-11-27 | End: 2023-11-27

## 2023-11-27 RX ORDER — PROCHLORPERAZINE MALEATE 10 MG
10 TABLET ORAL EVERY 6 HOURS PRN
Status: DISCONTINUED | OUTPATIENT
Start: 2023-11-27 | End: 2023-11-27 | Stop reason: HOSPADM

## 2023-11-27 RX ORDER — DIPHENHYDRAMINE HYDROCHLORIDE 50 MG/ML
50 INJECTION INTRAMUSCULAR; INTRAVENOUS AS NEEDED
Status: CANCELLED | OUTPATIENT
Start: 2023-11-27

## 2023-11-27 RX ORDER — FAMOTIDINE 10 MG/ML
20 INJECTION INTRAVENOUS ONCE
Status: COMPLETED | OUTPATIENT
Start: 2023-11-27 | End: 2023-11-27

## 2023-11-27 RX ORDER — ALBUTEROL SULFATE 0.83 MG/ML
3 SOLUTION RESPIRATORY (INHALATION) AS NEEDED
Status: DISCONTINUED | OUTPATIENT
Start: 2023-11-27 | End: 2023-11-27 | Stop reason: HOSPADM

## 2023-11-27 RX ADMIN — PALONOSETRON 250 MCG: 0.05 INJECTION, SOLUTION INTRAVENOUS at 10:00

## 2023-11-27 RX ADMIN — FAMOTIDINE 20 MG: 10 INJECTION, SOLUTION INTRAVENOUS at 10:01

## 2023-11-27 RX ADMIN — FOSAPREPITANT 150 MG: 150 INJECTION, POWDER, LYOPHILIZED, FOR SOLUTION INTRAVENOUS at 11:19

## 2023-11-27 RX ADMIN — POTASSIUM CHLORIDE 500 ML/HR: 2 INJECTION, SOLUTION, CONCENTRATE INTRAVENOUS at 09:58

## 2023-11-27 RX ADMIN — PACLITAXEL 105 MG: 6 INJECTION, SOLUTION INTRAVENOUS at 13:06

## 2023-11-27 RX ADMIN — CISPLATIN 42 MG: 1 INJECTION, SOLUTION INTRAVENOUS at 11:54

## 2023-11-27 RX ADMIN — DEXAMETHASONE SODIUM PHOSPHATE 20 MG: 4 INJECTION, SOLUTION INTRA-ARTICULAR; INTRALESIONAL; INTRAMUSCULAR; INTRAVENOUS; SOFT TISSUE at 11:00

## 2023-11-27 RX ADMIN — DIPHENHYDRAMINE HYDROCHLORIDE 50 MG: 50 CAPSULE ORAL at 10:01

## 2023-11-27 RX ADMIN — POTASSIUM CHLORIDE 500 ML/HR: 2 INJECTION, SOLUTION, CONCENTRATE INTRAVENOUS at 13:08

## 2023-11-27 ASSESSMENT — PAIN SCALES - GENERAL: PAINLEVEL: 3

## 2023-11-27 NOTE — SIGNIFICANT EVENT
11/27/23 0934   Prechemo Checklist   Has the patient been in the hospital, ED, or urgent care since last date of service No   Chemo/Immuno Consent Signed Yes   Protocol/Indications Verified Yes   Confirmed to previous date/time of medication Yes   Compared to previous dose Yes   All medications are dated accurately Yes   Pregnancy Test Negative Not applicable   Parameters Met Yes   Provider Notified Yes   Is Patient Proceeding With Treatment? Yes   BSA/Weight-Height Verified Yes   Dose Calculations Verified Yes

## 2023-11-27 NOTE — PROGRESS NOTES
Peripheral IV to left forearm flushed and discontinued. No complications. Treatment completed. Patient tolerated well. Remained asymptomatic throughout. Discharged home ambulatory with wife Violetta offering no complaints.

## 2023-11-28 ENCOUNTER — APPOINTMENT (OUTPATIENT)
Dept: RADIOLOGY | Facility: CLINIC | Age: 73
End: 2023-11-28
Payer: MEDICARE

## 2023-11-28 LAB
ALBUMIN SERPL BCP-MCNC: 3.8 G/DL (ref 3.4–5)
ALP SERPL-CCNC: 95 U/L (ref 33–136)
ALT SERPL W P-5'-P-CCNC: 20 U/L (ref 10–52)
ANION GAP SERPL CALC-SCNC: 15 MMOL/L (ref 10–20)
AST SERPL W P-5'-P-CCNC: 20 U/L (ref 9–39)
BILIRUB SERPL-MCNC: 0.3 MG/DL (ref 0–1.2)
BUN SERPL-MCNC: 30 MG/DL (ref 6–23)
CALCIUM SERPL-MCNC: 8.6 MG/DL (ref 8.6–10.6)
CHLORIDE SERPL-SCNC: 102 MMOL/L (ref 98–107)
CO2 SERPL-SCNC: 20 MMOL/L (ref 21–32)
CREAT SERPL-MCNC: 1.31 MG/DL (ref 0.5–1.3)
FERRITIN SERPL-MCNC: 1062 NG/ML (ref 20–300)
GFR SERPL CREATININE-BSD FRML MDRD: 58 ML/MIN/1.73M*2
GLUCOSE SERPL-MCNC: 158 MG/DL (ref 74–99)
IRON SATN MFR SERPL: 30 %
IRON SERPL-MCNC: 82 UG/DL (ref 35–150)
MAGNESIUM SERPL-MCNC: 1.39 MG/DL (ref 1.6–2.4)
POTASSIUM SERPL-SCNC: 4.9 MMOL/L (ref 3.5–5.3)
PROT SERPL-MCNC: 5.8 G/DL (ref 6.4–8.2)
SODIUM SERPL-SCNC: 132 MMOL/L (ref 136–145)
TIBC SERPL-MCNC: 276 UG/DL
UIBC SERPL-MCNC: 194 UG/DL (ref 110–370)

## 2023-11-30 ENCOUNTER — APPOINTMENT (OUTPATIENT)
Dept: HEMATOLOGY/ONCOLOGY | Facility: CLINIC | Age: 73
End: 2023-11-30
Payer: MEDICARE

## 2023-12-05 ENCOUNTER — TELEPHONE (OUTPATIENT)
Dept: HEMATOLOGY/ONCOLOGY | Facility: CLINIC | Age: 73
End: 2023-12-05

## 2023-12-05 ENCOUNTER — OFFICE VISIT (OUTPATIENT)
Dept: HEMATOLOGY/ONCOLOGY | Facility: CLINIC | Age: 73
End: 2023-12-05
Payer: MEDICARE

## 2023-12-05 ENCOUNTER — APPOINTMENT (OUTPATIENT)
Dept: HEMATOLOGY/ONCOLOGY | Facility: CLINIC | Age: 73
End: 2023-12-05
Payer: MEDICARE

## 2023-12-05 VITALS
RESPIRATION RATE: 14 BRPM | HEIGHT: 64 IN | HEART RATE: 99 BPM | TEMPERATURE: 97 F | SYSTOLIC BLOOD PRESSURE: 126 MMHG | WEIGHT: 216.93 LBS | BODY MASS INDEX: 37.04 KG/M2 | DIASTOLIC BLOOD PRESSURE: 72 MMHG | OXYGEN SATURATION: 98 %

## 2023-12-05 DIAGNOSIS — I82.621 ACUTE DEEP VEIN THROMBOSIS (DVT) OF BRACHIAL VEIN OF RIGHT UPPER EXTREMITY (MULTI): Primary | ICD-10-CM

## 2023-12-05 DIAGNOSIS — I82.621 ACUTE DEEP VEIN THROMBOSIS (DVT) OF BRACHIAL VEIN OF RIGHT UPPER EXTREMITY (MULTI): ICD-10-CM

## 2023-12-05 DIAGNOSIS — C67.9 MALIGNANT NEOPLASM OF URINARY BLADDER, UNSPECIFIED SITE (MULTI): ICD-10-CM

## 2023-12-05 PROCEDURE — 1159F MED LIST DOCD IN RCRD: CPT | Performed by: INTERNAL MEDICINE

## 2023-12-05 PROCEDURE — 3074F SYST BP LT 130 MM HG: CPT | Performed by: INTERNAL MEDICINE

## 2023-12-05 PROCEDURE — 99214 OFFICE O/P EST MOD 30 MIN: CPT | Performed by: INTERNAL MEDICINE

## 2023-12-05 PROCEDURE — 1125F AMNT PAIN NOTED PAIN PRSNT: CPT | Performed by: INTERNAL MEDICINE

## 2023-12-05 PROCEDURE — 1036F TOBACCO NON-USER: CPT | Performed by: INTERNAL MEDICINE

## 2023-12-05 PROCEDURE — 3078F DIAST BP <80 MM HG: CPT | Performed by: INTERNAL MEDICINE

## 2023-12-05 RX ORDER — APIXABAN 5 MG (74)
KIT ORAL
COMMUNITY
End: 2023-12-05 | Stop reason: ALTCHOICE

## 2023-12-05 ASSESSMENT — PAIN SCALES - GENERAL: PAINLEVEL: 2

## 2023-12-05 NOTE — PROGRESS NOTES
Patient to have cystoscopy end of January with Dr Nguyen.  Labs prior to follow-up in Early February.  [facility]  Patient completed RT this week and follows up with Dr Negro in February.  Call back instructions reviewed.  Patient verbalized understanding.

## 2023-12-05 NOTE — TELEPHONE ENCOUNTER
MD Deann Crystal, RN  Patient actually only needs 6 weeks of blood thinner but he should have received total of 5 weeks so he could be finished after he runs out of this medication sometime end of this month/5-week is okay as it was only superficial venous thrombosis, I have reviewed records from [facility].    Call placed to patient.  Notified of provider recommendation above.  Wife appreciative of call and verbalized understanding.  Call back instructions reviewed.  Patient verbalized understanding.

## 2023-12-05 NOTE — PROGRESS NOTES
Patient ID: Keenan Reveles is a 72 y.o. male.  Referring Physician: Eladio Jarquin MD  [address] [facility], [place] [address]  Primary Care Provider: Syed Brewster MD      Patient instruction    Return for follow-up in first week of February 2024  CBC, CMP, magnesium      ASSESSMENT, PROBLEM LIST, DECISION MAKING, PLAN.    Initial History of bladder cancer initially superficial diagnosed sometime in 2019 and was treated with TURBT and later received intravesicular BCG x12 treatment which was discontinued in 2022.    Muscle invasive bladder carcinoma diagnosed in August 2023, staging work-up revealed 8 mm left iliac chain lymph node concerning for metastatic lymph node in addition to lobular enhancing urinary bladder mass along the left lateral and posterior wall and 6 mm pulmonary nodule in superior segment of right lower lobe 3 mm pulmonary nodule in right lower lobe.  PET scan is currently pending  Patient has declined surgery and is interested in bladder preservation and has been seen by Dr. Negro    Patient was started on bladder preservation concomitant chemoradiation using cisplatin and Taxol started on October 9, 2023 and finished last chemotherapy on November 27, 2023, patient finished radiation therapy on December 4, 2023          PAST MEDICAL HISTORY:       gouty arthritis, hearing difficulty, coronary artery disease status post stent and has been followed by Dr. Armstrong, hypertension,        INTERVAL HISTORY :     Patient is here today for follow-up patient has started taking Chemoradiation from October 9, 2023 under care of Dr. Negro, he seems to be tolerating without any major problem.   Patient finished chemoradiation, last radiation was yesterday on December 4, 2023, overall he tolerated well without any major problem    PHYSICAL EXAM:  General: Conscious, alert, oriented x3, not in acute distress.  HEENT:    No icterus.    Chest:Bilateral symmetrical,     CVS:   S1, S2.    Abdomen:  Soft, no palpable mass   Extremities: No clubbing, cyanosis,     Skin: No petechial rash.        ASSESSMENT & PLAN:    Muscle invasive bladder carcinoma diagnosed in August 2023, staging work-up revealed 8 mm left iliac chain lymph node concerning for metastatic lymph node in addition to lobular enhancing urinary bladder mass along the left lateral and posterior wall and 6 mm pulmonary nodule in superior segment of right lower lobe 3 mm pulmonary nodule in right lower lobe.  PET scan showed no pulmonary nodule activity, there was FDG avid left iliac chain lymph node highly suspicious for metastatic disease  I have discussed with Dr. Negro in the past and left iliac lymph node would be in the radiation field     Patient was started on bladder preservation concomitant chemoradiation using cisplatin and Taxol   Patient received week #1 of chemotherapy on October 9, 2023 and week #8 was on November 27, 2023  Last radiation therapy was on December 4, 2023    Patient has appointment with Dr. Nguyen for possible cystoscopy January 29, 2024  Will return for follow-up 1 week later  Further decision will be made based on cystoscopy result and may consider doing follow-up imaging  Discussed with patient and wife        Patient had right upper extremity swelling and was seen in the emergency room in [place] on 17 November 2023 and there was superficial venous thrombosis in the right basilic vein and right cephalic vein for which she was placed on Eliquis  Will continue Eliquis for total of 45 days and then discontinue in end of December 2023        LABS:  Infusion on 11/27/2023   Component Date Value Ref Range Status    Iron 11/27/2023 82  35 - 150 ug/dL Final    UIBC 11/27/2023 194  110 - 370 ug/dL Final    TIBC 11/27/2023 276  ug/dL Final    % Saturation 11/27/2023 30  % Final    Ferritin 11/27/2023 1,062 (H)  20 - 300 ng/mL Final    WBC 11/27/2023 2.4 (L)  4.4 - 11.3 x10*3/uL Final    nRBC 11/27/2023     Final    RBC 11/27/2023 2.62 (L)  4.50 - 5.90 x10*6/uL Final    Hemoglobin 11/27/2023 8.4 (L)  13.5 - 17.5 g/dL Final    Hematocrit 11/27/2023 25.0 (L)  41.0 - 52.0 % Final    MCV 11/27/2023 95  80 - 100 fL Final    MCH 11/27/2023 32.1  26.0 - 34.0 pg Final    MCHC 11/27/2023 33.6  32.0 - 36.0 g/dL Final    RDW 11/27/2023 17.1 (H)  11.5 - 14.5 % Final    Platelets 11/27/2023 159  150 - 450 x10*3/uL Final    Neutrophils % 11/27/2023 74.6  40.0 - 80.0 % Final    Immature Granulocytes %, Automated 11/27/2023 2.0 (H)  0.0 - 0.9 % Final    Lymphocytes % 11/27/2023 11.1  13.0 - 44.0 % Final    Monocytes % 11/27/2023 11.1  2.0 - 10.0 % Final    Eosinophils % 11/27/2023 0.8  0.0 - 6.0 % Final    Basophils % 11/27/2023 0.4  0.0 - 2.0 % Final    Neutrophils Absolute 11/27/2023 1.82  1.60 - 5.50 x10*3/uL Final    Immature Granulocytes Absolute, Au* 11/27/2023 0.05  0.00 - 0.50 x10*3/uL Final    Lymphocytes Absolute 11/27/2023 0.27 (L)  0.80 - 3.00 x10*3/uL Final    Monocytes Absolute 11/27/2023 0.27  0.05 - 0.80 x10*3/uL Final    Eosinophils Absolute 11/27/2023 0.02  0.00 - 0.40 x10*3/uL Final    Basophils Absolute 11/27/2023 0.01  0.00 - 0.10 x10*3/uL Final    Glucose 11/27/2023 158 (H)  74 - 99 mg/dL Final    Sodium 11/27/2023 132 (L)  136 - 145 mmol/L Final    Potassium 11/27/2023 4.9  3.5 - 5.3 mmol/L Final    Chloride 11/27/2023 102  98 - 107 mmol/L Final    Bicarbonate 11/27/2023 20 (L)  21 - 32 mmol/L Final    Anion Gap 11/27/2023 15  10 - 20 mmol/L Final    Urea Nitrogen 11/27/2023 30 (H)  6 - 23 mg/dL Final    Creatinine 11/27/2023 1.31 (H)  0.50 - 1.30 mg/dL Final    eGFR 11/27/2023 58 (L)  >60 mL/min/1.73m*2 Final    Calcium 11/27/2023 8.6  8.6 - 10.6 mg/dL Final    Albumin 11/27/2023 3.8  3.4 - 5.0 g/dL Final    Alkaline Phosphatase 11/27/2023 95  33 - 136 U/L Final    Total Protein 11/27/2023 5.8 (L)  6.4 - 8.2 g/dL Final    AST 11/27/2023 20  9 - 39 U/L Final    Bilirubin, Total 11/27/2023 0.3  0.0 - 1.2 mg/dL  Final    ALT 11/27/2023 20  10 - 52 U/L Final    Magnesium 11/27/2023 1.39 (L)  1.60 - 2.40 mg/dL Final   Lab on 11/24/2023   Component Date Value Ref Range Status    WBC 11/24/2023 3.0 (L)  4.4 - 11.3 x10*3/uL Final    nRBC 11/24/2023    Final    RBC 11/24/2023 3.24 (L)  4.50 - 5.90 x10*6/uL Final    Hemoglobin 11/24/2023 10.3 (L)  13.5 - 17.5 g/dL Final    Hematocrit 11/24/2023 31.0 (L)  41.0 - 52.0 % Final    MCV 11/24/2023 96  80 - 100 fL Final    MCH 11/24/2023 31.8  26.0 - 34.0 pg Final    MCHC 11/24/2023 33.2  32.0 - 36.0 g/dL Final    RDW 11/24/2023 16.8 (H)  11.5 - 14.5 % Final    Platelets 11/24/2023 161  150 - 450 x10*3/uL Final    Neutrophils % 11/24/2023 79.5  40.0 - 80.0 % Final    Immature Granulocytes %, Automated 11/24/2023 0.3  0.0 - 0.9 % Final    Lymphocytes % 11/24/2023 12.6  13.0 - 44.0 % Final    Monocytes % 11/24/2023 6.6  2.0 - 10.0 % Final    Eosinophils % 11/24/2023 0.7  0.0 - 6.0 % Final    Basophils % 11/24/2023 0.3  0.0 - 2.0 % Final    Neutrophils Absolute 11/24/2023 2.40  1.60 - 5.50 x10*3/uL Final    Immature Granulocytes Absolute, Au* 11/24/2023 0.01  0.00 - 0.50 x10*3/uL Final    Lymphocytes Absolute 11/24/2023 0.38 (L)  0.80 - 3.00 x10*3/uL Final    Monocytes Absolute 11/24/2023 0.20  0.05 - 0.80 x10*3/uL Final    Eosinophils Absolute 11/24/2023 0.02  0.00 - 0.40 x10*3/uL Final    Basophils Absolute 11/24/2023 0.01  0.00 - 0.10 x10*3/uL Final    Glucose 11/24/2023 125 (H)  74 - 99 mg/dL Final    Sodium 11/24/2023 134 (L)  136 - 145 mmol/L Final    Potassium 11/24/2023 4.8  3.5 - 5.3 mmol/L Final    Chloride 11/24/2023 101  98 - 107 mmol/L Final    Bicarbonate 11/24/2023 22  21 - 32 mmol/L Final    Anion Gap 11/24/2023 16  10 - 20 mmol/L Final    Urea Nitrogen 11/24/2023 36 (H)  6 - 23 mg/dL Final    Creatinine 11/24/2023 1.43 (H)  0.50 - 1.30 mg/dL Final    eGFR 11/24/2023 52 (L)  >60 mL/min/1.73m*2 Final    Calcium 11/24/2023 9.1  8.6 - 10.6 mg/dL Final    Albumin 11/24/2023 4.0   3.4 - 5.0 g/dL Final    Alkaline Phosphatase 11/24/2023 85  33 - 136 U/L Final    Total Protein 11/24/2023 6.4  6.4 - 8.2 g/dL Final    AST 11/24/2023 18  9 - 39 U/L Final    Bilirubin, Total 11/24/2023 0.4  0.0 - 1.2 mg/dL Final    ALT 11/24/2023 22  10 - 52 U/L Final   Infusion on 11/20/2023   Component Date Value Ref Range Status    WBC 11/20/2023 2.9 (L)  4.4 - 11.3 x10*3/uL Final    nRBC 11/20/2023    Final    RBC 11/20/2023 2.72 (L)  4.50 - 5.90 x10*6/uL Final    Hemoglobin 11/20/2023 8.5 (L)  13.5 - 17.5 g/dL Final    Hematocrit 11/20/2023 25.8 (L)  41.0 - 52.0 % Final    MCV 11/20/2023 95  80 - 100 fL Final    MCH 11/20/2023 31.3  26.0 - 34.0 pg Final    MCHC 11/20/2023 32.9  32.0 - 36.0 g/dL Final    RDW 11/20/2023 15.8 (H)  11.5 - 14.5 % Final    Platelets 11/20/2023 123 (L)  150 - 450 x10*3/uL Final    Neutrophils % 11/20/2023 77.0  40.0 - 80.0 % Final    Immature Granulocytes %, Automated 11/20/2023 2.1 (H)  0.0 - 0.9 % Final    Lymphocytes % 11/20/2023 11.1  13.0 - 44.0 % Final    Monocytes % 11/20/2023 8.4  2.0 - 10.0 % Final    Eosinophils % 11/20/2023 0.7  0.0 - 6.0 % Final    Basophils % 11/20/2023 0.7  0.0 - 2.0 % Final    Neutrophils Absolute 11/20/2023 2.21  1.60 - 5.50 x10*3/uL Final    Immature Granulocytes Absolute, Au* 11/20/2023 0.06  0.00 - 0.50 x10*3/uL Final    Lymphocytes Absolute 11/20/2023 0.32 (L)  0.80 - 3.00 x10*3/uL Final    Monocytes Absolute 11/20/2023 0.24  0.05 - 0.80 x10*3/uL Final    Eosinophils Absolute 11/20/2023 0.02  0.00 - 0.40 x10*3/uL Final    Basophils Absolute 11/20/2023 0.02  0.00 - 0.10 x10*3/uL Final   Lab on 11/17/2023   Component Date Value Ref Range Status    WBC 11/17/2023 4.7  4.4 - 11.3 x10*3/uL Final    nRBC 11/17/2023    Final    RBC 11/17/2023 3.54 (L)  4.50 - 5.90 x10*6/uL Final    Hemoglobin 11/17/2023 11.2 (L)  13.5 - 17.5 g/dL Final    Hematocrit 11/17/2023 33.5 (L)  41.0 - 52.0 % Final    MCV 11/17/2023 95  80 - 100 fL Final    MCH 11/17/2023 31.6   26.0 - 34.0 pg Final    MCHC 11/17/2023 33.4  32.0 - 36.0 g/dL Final    RDW 11/17/2023 15.6 (H)  11.5 - 14.5 % Final    Platelets 11/17/2023 130 (L)  150 - 450 x10*3/uL Final    Neutrophils % 11/17/2023 81.2  40.0 - 80.0 % Final    Immature Granulocytes %, Automated 11/17/2023 0.4  0.0 - 0.9 % Final    Lymphocytes % 11/17/2023 10.4  13.0 - 44.0 % Final    Monocytes % 11/17/2023 7.2  2.0 - 10.0 % Final    Eosinophils % 11/17/2023 0.4  0.0 - 6.0 % Final    Basophils % 11/17/2023 0.4  0.0 - 2.0 % Final    Neutrophils Absolute 11/17/2023 3.80  1.60 - 5.50 x10*3/uL Final    Immature Granulocytes Absolute, Au* 11/17/2023 0.02  0.00 - 0.50 x10*3/uL Final    Lymphocytes Absolute 11/17/2023 0.49 (L)  0.80 - 3.00 x10*3/uL Final    Monocytes Absolute 11/17/2023 0.34  0.05 - 0.80 x10*3/uL Final    Eosinophils Absolute 11/17/2023 0.02  0.00 - 0.40 x10*3/uL Final    Basophils Absolute 11/17/2023 0.02  0.00 - 0.10 x10*3/uL Final    Glucose 11/17/2023 153 (H)  74 - 99 mg/dL Final    Sodium 11/17/2023 132 (L)  136 - 145 mmol/L Final    Potassium 11/17/2023 4.9  3.5 - 5.3 mmol/L Final    Chloride 11/17/2023 100  98 - 107 mmol/L Final    Bicarbonate 11/17/2023 24  21 - 32 mmol/L Final    Anion Gap 11/17/2023 13  10 - 20 mmol/L Final    Urea Nitrogen 11/17/2023 41 (H)  6 - 23 mg/dL Final    Creatinine 11/17/2023 1.37 (H)  0.50 - 1.30 mg/dL Final    eGFR 11/17/2023 55 (L)  >60 mL/min/1.73m*2 Final    Calcium 11/17/2023 9.3  8.6 - 10.6 mg/dL Final    Albumin 11/17/2023 4.1  3.4 - 5.0 g/dL Final    Alkaline Phosphatase 11/17/2023 110  33 - 136 U/L Final    Total Protein 11/17/2023 6.3 (L)  6.4 - 8.2 g/dL Final    AST 11/17/2023 16  9 - 39 U/L Final    Bilirubin, Total 11/17/2023 0.4  0.0 - 1.2 mg/dL Final    ALT 11/17/2023 22  10 - 52 U/L Final    Magnesium 11/17/2023 1.68  1.60 - 2.40 mg/dL Final   Lab on 11/10/2023   Component Date Value Ref Range Status    WBC 11/10/2023 4.6  4.4 - 11.3 x10*3/uL Final    nRBC 11/10/2023    Final    RBC  11/10/2023 3.52 (L)  4.50 - 5.90 x10*6/uL Final    Hemoglobin 11/10/2023 11.0 (L)  13.5 - 17.5 g/dL Final    Hematocrit 11/10/2023 32.1 (L)  41.0 - 52.0 % Final    MCV 11/10/2023 91  80 - 100 fL Final    MCH 11/10/2023 31.3  26.0 - 34.0 pg Final    MCHC 11/10/2023 34.3  32.0 - 36.0 g/dL Final    RDW 11/10/2023 14.7 (H)  11.5 - 14.5 % Final    Platelets 11/10/2023 146 (L)  150 - 450 x10*3/uL Final    Neutrophils % 11/10/2023 84.1  40.0 - 80.0 % Final    Immature Granulocytes %, Automated 11/10/2023 0.7  0.0 - 0.9 % Final    Lymphocytes % 11/10/2023 7.6  13.0 - 44.0 % Final    Monocytes % 11/10/2023 7.2  2.0 - 10.0 % Final    Eosinophils % 11/10/2023 0.2  0.0 - 6.0 % Final    Basophils % 11/10/2023 0.2  0.0 - 2.0 % Final    Neutrophils Absolute 11/10/2023 3.87  1.60 - 5.50 x10*3/uL Final    Immature Granulocytes Absolute, Au* 11/10/2023 0.03  0.00 - 0.50 x10*3/uL Final    Lymphocytes Absolute 11/10/2023 0.35 (L)  0.80 - 3.00 x10*3/uL Final    Monocytes Absolute 11/10/2023 0.33  0.05 - 0.80 x10*3/uL Final    Eosinophils Absolute 11/10/2023 0.01  0.00 - 0.40 x10*3/uL Final    Basophils Absolute 11/10/2023 0.01  0.00 - 0.10 x10*3/uL Final    Glucose 11/10/2023 249 (H)  74 - 99 mg/dL Final    Sodium 11/10/2023 134 (L)  136 - 145 mmol/L Final    Potassium 11/10/2023 5.0  3.5 - 5.3 mmol/L Final    Chloride 11/10/2023 98  98 - 107 mmol/L Final    Bicarbonate 11/10/2023 22  21 - 32 mmol/L Final    Anion Gap 11/10/2023 19  10 - 20 mmol/L Final    Urea Nitrogen 11/10/2023 41 (H)  6 - 23 mg/dL Final    Creatinine 11/10/2023 1.55 (H)  0.50 - 1.30 mg/dL Final    eGFR 11/10/2023 47 (L)  >60 mL/min/1.73m*2 Final    Calcium 11/10/2023 9.1  8.6 - 10.6 mg/dL Final    Albumin 11/10/2023 3.9  3.4 - 5.0 g/dL Final    Alkaline Phosphatase 11/10/2023 94  33 - 136 U/L Final    Total Protein 11/10/2023 6.0 (L)  6.4 - 8.2 g/dL Final    AST 11/10/2023 16  9 - 39 U/L Final    Bilirubin, Total 11/10/2023 0.4  0.0 - 1.2 mg/dL Final    ALT  11/10/2023 22  10 - 52 U/L Final    Magnesium 11/10/2023 1.48 (L)  1.60 - 2.40 mg/dL Final    Magnesium 11/10/2023 1.52 (L)  1.60 - 2.40 mg/dL Final       IMAGING:  No results found.    VITALS: BSA: 2.11 meters squared /72   Pulse 99   Temp 36.1 °C (97 °F)   Resp 14   Wt 98.4 kg (216 lb 14.9 oz)   SpO2 98%   BMI 37.22 kg/m²     Current Outpatient Medications   Medication Sig Dispense Refill    allopurinol (Zyloprim) 100 mg tablet Take 1 tablet (100 mg) by mouth 2 times a day.      apixaban (Eliquis) 5 mg tablet Take 1 tablet (5 mg) by mouth 2 times a day.      apple cider vinegar 500 mg tablet as directed Orally      aspirin 81 mg EC tablet Take 1 tablet (81 mg) by mouth once daily.      atorvastatin (Lipitor) 20 mg tablet Take 1 tablet (20 mg) by mouth once daily.      Cinnamon 500 mg capsule Take by mouth.      dexAMETHasone (Decadron) 4 mg tablet Take 12 mg (3 tablets) night before and morning of first chemotherapy 6 tablet 0    Eliquis DVT-PE Treat 30D Start 5 mg (74 tabs) tablets,dose pack TAKE AS DIRECTED on the package      lisinopril 20 mg tablet Take 1 tablet (20 mg) by mouth once daily.      magnesium, as gluconate, (Magonate) 27 mg magnesium (500 mg) tablet Take 1 tablet (27 mg) by mouth 2 times a day.      metoprolol succinate XL (Toprol-XL) 50 mg 24 hr tablet Take 1 tablet (50 mg) by mouth once daily.      povidone-iodine 5 % ophthalmic solution 1 drop if needed for wound care.      prochlorperazine (Compazine) 10 mg tablet Take 1 tablet (10 mg) by mouth 3 times a day as needed for nausea.      triamterene-hydrochlorothiazid (Dyazide) 37.5-25 mg capsule Take 1 capsule by mouth every other day.       No current facility-administered medications for this visit.     Facility-Administered Medications Ordered in Other Visits   Medication Dose Route Frequency Provider Last Rate Last Admin    albuterol 2.5 mg /3 mL (0.083 %) nebulizer solution 3 mL  3 mL nebulization PRN Eladio Jarquin MD         dextrose 5 % in water (D5W) bolus  500 mL intravenous PRN Eladio Kincaid MD        diphenhydrAMINE (BENADryl) injection 50 mg  50 mg intravenous PRN Eladio Kincaid MD        EPINEPHrine (Epipen) injection syringe 0.3 mg  0.3 mg intramuscular q5 min PRN Eladio Kincaid MD        famotidine PF (Pepcid) injection 20 mg  20 mg intravenous Once PRN Eladio Kincaid MD        methylPREDNISolone sod succinate (PF) (SOLU-Medrol) 40 mg/mL injection 40 mg  40 mg intravenous PRN Eladio Kincaid MD        prochlorperazine (Compazine) injection 10 mg  10 mg intravenous q6h PRN Eladio Kincaid MD        prochlorperazine (Compazine) tablet 10 mg  10 mg oral q6h PRN Eladio Kincaid MD        sodium chloride 0.9 % bolus 500 mL  500 mL intravenous PRN Eladio Kincaid MD           ALLERGY: Penicillins    SOCIAL HISTORY: He  reports no history of alcohol use. He  reports no history of drug use.    Medication reviewed in e-chart  Patient is monitored for medication toxicity  labs reviewed and interpreted independently, X rays independently reviewed  Notes from other physicians involved in care were reviewed    Charting was completed using voice recognition technology and may include unintended errors.    ELADIO KINCAID MD, OLIVIA.    Tyrone Shane Master Clinician in Hematology and Oncology  Medical Director, [facility] [place] [facility].  [place] office  Phone [phone]  Fax      [phone]  [facility] /[place].  Phone [phone]  Fax     [phone]

## 2024-02-12 ENCOUNTER — LAB (OUTPATIENT)
Dept: LAB | Facility: CLINIC | Age: 74
End: 2024-02-12
Payer: MEDICARE

## 2024-02-12 DIAGNOSIS — C67.9 MALIGNANT NEOPLASM OF URINARY BLADDER, UNSPECIFIED SITE (MULTI): ICD-10-CM

## 2024-02-12 PROCEDURE — 36415 COLL VENOUS BLD VENIPUNCTURE: CPT

## 2024-02-13 ENCOUNTER — APPOINTMENT (OUTPATIENT)
Dept: LAB | Facility: CLINIC | Age: 74
End: 2024-02-13
Payer: MEDICARE

## 2024-02-13 ENCOUNTER — OFFICE VISIT (OUTPATIENT)
Dept: HEMATOLOGY/ONCOLOGY | Facility: CLINIC | Age: 74
End: 2024-02-13
Payer: MEDICARE

## 2024-02-13 VITALS
DIASTOLIC BLOOD PRESSURE: 70 MMHG | RESPIRATION RATE: 22 BRPM | SYSTOLIC BLOOD PRESSURE: 128 MMHG | TEMPERATURE: 98.4 F | BODY MASS INDEX: 37.82 KG/M2 | OXYGEN SATURATION: 98 % | WEIGHT: 220.46 LBS | HEART RATE: 70 BPM

## 2024-02-13 DIAGNOSIS — C67.9 MALIGNANT NEOPLASM OF URINARY BLADDER, UNSPECIFIED SITE (MULTI): ICD-10-CM

## 2024-02-13 DIAGNOSIS — R91.1 PULMONARY NODULE: Primary | ICD-10-CM

## 2024-02-13 LAB
BASOPHILS # BLD AUTO: 0.03 X10*3/UL (ref 0–0.1)
BASOPHILS NFR BLD AUTO: 0.5 %
EOSINOPHIL # BLD AUTO: 0.13 X10*3/UL (ref 0–0.4)
EOSINOPHIL NFR BLD AUTO: 2.4 %
ERYTHROCYTE [DISTWIDTH] IN BLOOD BY AUTOMATED COUNT: 13.5 % (ref 11.5–14.5)
HCT VFR BLD AUTO: 34.7 % (ref 41–52)
HGB BLD-MCNC: 11.4 G/DL (ref 13.5–17.5)
IMM GRANULOCYTES # BLD AUTO: 0.04 X10*3/UL (ref 0–0.5)
IMM GRANULOCYTES NFR BLD AUTO: 0.7 % (ref 0–0.9)
LYMPHOCYTES # BLD AUTO: 0.91 X10*3/UL (ref 0.8–3)
LYMPHOCYTES NFR BLD AUTO: 16.6 %
MCH RBC QN AUTO: 33.4 PG (ref 26–34)
MCHC RBC AUTO-ENTMCNC: 32.9 G/DL (ref 32–36)
MCV RBC AUTO: 102 FL (ref 80–100)
MONOCYTES # BLD AUTO: 0.55 X10*3/UL (ref 0.05–0.8)
MONOCYTES NFR BLD AUTO: 10 %
NEUTROPHILS # BLD AUTO: 3.82 X10*3/UL (ref 1.6–5.5)
NEUTROPHILS NFR BLD AUTO: 69.8 %
NRBC BLD-RTO: ABNORMAL /100{WBCS}
PLATELET # BLD AUTO: 163 X10*3/UL (ref 150–450)
RBC # BLD AUTO: 3.41 X10*6/UL (ref 4.5–5.9)
WBC # BLD AUTO: 5.5 X10*3/UL (ref 4.4–11.3)

## 2024-02-13 PROCEDURE — 1036F TOBACCO NON-USER: CPT | Performed by: INTERNAL MEDICINE

## 2024-02-13 PROCEDURE — 1159F MED LIST DOCD IN RCRD: CPT | Performed by: INTERNAL MEDICINE

## 2024-02-13 PROCEDURE — 3074F SYST BP LT 130 MM HG: CPT | Performed by: INTERNAL MEDICINE

## 2024-02-13 PROCEDURE — 36415 COLL VENOUS BLD VENIPUNCTURE: CPT | Performed by: INTERNAL MEDICINE

## 2024-02-13 PROCEDURE — 99215 OFFICE O/P EST HI 40 MIN: CPT | Performed by: INTERNAL MEDICINE

## 2024-02-13 PROCEDURE — 1126F AMNT PAIN NOTED NONE PRSNT: CPT | Performed by: INTERNAL MEDICINE

## 2024-02-13 PROCEDURE — 3078F DIAST BP <80 MM HG: CPT | Performed by: INTERNAL MEDICINE

## 2024-02-13 PROCEDURE — 85025 COMPLETE CBC W/AUTO DIFF WBC: CPT | Performed by: INTERNAL MEDICINE

## 2024-02-13 PROCEDURE — 84075 ASSAY ALKALINE PHOSPHATASE: CPT | Performed by: INTERNAL MEDICINE

## 2024-02-13 ASSESSMENT — PAIN SCALES - GENERAL: PAINLEVEL: 0-NO PAIN

## 2024-02-13 NOTE — PROGRESS NOTES
Patient ID: Keenan Reveles is a 73 y.o. male.  Referring Physician: Eladio Jarquin MD  [address] [facility], [place] [address]  Primary Care Provider: Syed Brewster MD    ORDERS & PATIENT INSTRUCTIONS:    Discontinue oral magnesium  CAT scan of the chest abdomen pelvis without IV contrast for follow-up on bladder cancer, abdominal lymphadenopathy and pulmonary nodule in end of February 2024  Please obtain cystoscopy results from Dr. Clifton Nguyen done 2 weeks ago  Patient is also planning to have repeat cystoscopy and TURBT later this month, please make sure we get a copy  Return for follow-up in 1 month      CBC, CMP, magnesium      ASSESSMENT, PROBLEM LIST, DECISION MAKING, PLAN.    Initial History of bladder cancer initially superficial diagnosed sometime in 2019 and was treated with TURBT and later received intravesicular BCG x12 treatment which was discontinued in 2022.    Muscle invasive bladder carcinoma diagnosed in August 2023, staging work-up revealed 8 mm left iliac chain lymph node concerning for metastatic lymph node in addition to lobular enhancing urinary bladder mass along the left lateral and posterior wall and 6 mm pulmonary nodule in superior segment of right lower lobe 3 mm pulmonary nodule in right lower lobe.  PET scan is currently pending  Patient has declined surgery and is interested in bladder preservation and has been seen by Dr. Negro    Patient was started on bladder preservation concomitant chemoradiation using cisplatin and Taxol started on October 9, 2023 and finished last chemotherapy on November 27, 2023, patient finished radiation therapy on December 4, 2023          PAST MEDICAL HISTORY:       gouty arthritis, hearing difficulty, coronary artery disease status post stent and has been followed by Dr. Armstrong, hypertension,        INTERVAL HISTORY :     Patient is here today for follow-up on bladder cancers, who is status post chemoradiation from  October 9, 2023 and finished on December 4, 2023.  He received radiation under care of Dr. Negro.  Patient had a cystoscopy done recently by Dr. Nguyen and according the patient there was no active tumor but he is planning to examined under anesthesia at surgery center later this month and biopsy is planned     PHYSICAL EXAM:  General: Conscious, alert, oriented x3, not in acute distress.  HEENT:    No icterus.    Chest:Bilateral symmetrical,     CVS:  S1, S2.    Abdomen:  Soft, no palpable mass   Extremities: No clubbing, cyanosis,     Skin: No petechial rash.        ASSESSMENT & PLAN:    Muscle invasive bladder carcinoma diagnosed in August 2023, staging work-up revealed 8 mm left iliac chain lymph node concerning for metastatic lymph node in addition to lobular enhancing urinary bladder mass along the left lateral and posterior wall and 6 mm pulmonary nodule in superior segment of right lower lobe 3 mm pulmonary nodule in right lower lobe.  PET scan showed no pulmonary nodule activity, there was FDG avid left iliac chain lymph node highly suspicious for metastatic disease. left iliac lymph node was in the radiation field     Patient received bladder preservation concomitant chemoradiation using cisplatin and Taxol from October 9, 2023 and finished ninth week of chemotherapy on November 27, 2023 and last radiation therapy was on December 4, 2023.    Patient had cystoscopy by Dr. Nguyen on January 29, 2024 and according to the patient there was no active tumor although he is planning to have repeat cystoscopy and possible biopsy at surgery center in end of this month   We will obtain cystoscopy results from Dr. Nguyen     We will do follow-up CAT scan of the chest abdomen pelvis in 2 to 3-week for follow-up on pulmonary nodule pelvic lymphadenopathy and bladder cancer and will return for follow-up in 1 month        Patient had right upper extremity swelling and was seen in the emergency room in [place] on 17  "November 2023 and there was superficial venous thrombosis in the right basilic vein and right cephalic vein for which she was placed on Eliquis  Will continued Eliquis for total of 45 days and then discontinue in end of December 2023            VITALS:   2.13 meters squared /70   Pulse 70   Temp 36.9 °C (98.4 °F) (Temporal)   Resp 22   Wt 100 kg (220 lb 7.4 oz)   SpO2 98%   BMI 37.82 kg/m²     LABS:    CBC:  Recent Labs     02/13/24  1427 11/27/23  0937 11/24/23  1304 11/20/23  0959 11/17/23  1305   WBC 5.5 2.4* 3.0* 2.9* 4.7   HGB 11.4* 8.4* 10.3* 8.5* 11.2*   HCT 34.7* 25.0* 31.0* 25.8* 33.5*    159 161 123* 130*   * 95 96 95 95       CMP:  Recent Labs     11/27/23  0937 11/24/23  1304 11/17/23  1305 11/10/23  1133 11/02/23  1138 10/27/23  1159   * 134* 132* 134* 136 134*   K 4.9 4.8 4.9 5.0 4.8 4.6    101 100 98 101 102   CO2 20* 22 24 22 24 24   ANIONGAP 15 16 13 19 16 13   BUN 30* 36* 41* 41* 38* 35*   CREATININE 1.31* 1.43* 1.37* 1.55* 1.41* 1.38*   EGFR 58* 52* 55* 47* 53* 54*   MG 1.39*  --  1.68 1.52*  1.48* 1.55* 1.37*  1.39*     Recent Labs     11/27/23  0937 11/24/23  1304 11/17/23  1305 11/10/23  1133 11/02/23  1138   ALBUMIN 3.8 4.0 4.1 3.9 4.0   ALKPHOS 95 85 110 94 91   ALT 20 22 22 22 27   AST 20 18 16 16 19   BILITOT 0.3 0.4 0.4 0.4 0.5       HEME/ENDO:  Recent Labs     11/27/23  0937 08/25/23  1245 05/01/20  0910   FERRITIN 1,062* 361*  --    IRONSAT 30 23*  --    TSH  --  2.02 2.60   [id number]  --   --  589        MICRO: No results for input(s): \"ESR\", \"CRP\", \"PROCAL\" in the last 76995 hours.  No results found for the last 90 days.        TUMOR MARKERS:  No results found for: \"LABCA2\", \"CEA\", \"\", \"PSA\", \"AFPTM\", \"HCGTM\", \"\"             IMAGING:         No image results found.       Current Outpatient Medications   Medication Sig Dispense Refill    allopurinol (Zyloprim) 100 mg tablet Take 1 tablet (100 mg) by mouth 2 times a day.      aspirin 81 " mg EC tablet Take 1 tablet (81 mg) by mouth once daily.      atorvastatin (Lipitor) 20 mg tablet Take 1 tablet (20 mg) by mouth once daily.      lisinopril 20 mg tablet Take 1 tablet (20 mg) by mouth once daily.      magnesium, as gluconate, (Magonate) 27 mg magnesium (500 mg) tablet Take 1 tablet (27 mg) by mouth 2 times a day.      metoprolol succinate XL (Toprol-XL) 50 mg 24 hr tablet Take 1 tablet (50 mg) by mouth once daily.      triamterene-hydrochlorothiazid (Dyazide) 37.5-25 mg capsule Take 1 capsule by mouth every other day.      Cinnamon 500 mg capsule Take by mouth.       No current facility-administered medications for this visit.            FAMILY HISTORY:   No family history on file.     ALLERGY: Penicillins    SOCIAL HISTORY: He  reports no history of alcohol use. He  reports no history of drug use.            Medication reviewed in e-chart  Patient is monitored for medication toxicity  labs reviewed and interpreted independently, X rays independently reviewed  Notes from other physicians involved in care were reviewed    Charting was completed using voice recognition technology and may include unintended errors.    TEX KINCAID MD, OLIVIA.    Tyrone Shane Master Clinician in Hematology and Oncology  Medical Director, [facility] at [facility].  [place] office  Phone [phone]  Fax      [phone]  [facility] /[place].  Phone [phone]  Fax     [phone]

## 2024-02-13 NOTE — PROGRESS NOTES
DC oral magnesium.  CT CAP without IV Contrast to be done at [facility] as previous.  Will obtain cystoscopy report from Dr Nguyen several months ago as well as upcoming end of month.  Follow-up in 1 month with labs several days prior.  Teaching reiterated regarding importance of having labs ahead of time.  Call back instructions reviewed.  Patient verbalized understanding.

## 2024-02-14 LAB
ALBUMIN SERPL BCP-MCNC: 4.1 G/DL (ref 3.4–5)
ALP SERPL-CCNC: 123 U/L (ref 33–136)
ALT SERPL W P-5'-P-CCNC: 16 U/L (ref 10–52)
ANION GAP SERPL CALC-SCNC: 17 MMOL/L (ref 10–20)
AST SERPL W P-5'-P-CCNC: 16 U/L (ref 9–39)
BILIRUB SERPL-MCNC: 0.7 MG/DL (ref 0–1.2)
BUN SERPL-MCNC: 38 MG/DL (ref 6–23)
CALCIUM SERPL-MCNC: 9.9 MG/DL (ref 8.6–10.6)
CHLORIDE SERPL-SCNC: 101 MMOL/L (ref 98–107)
CO2 SERPL-SCNC: 19 MMOL/L (ref 21–32)
CREAT SERPL-MCNC: 1.69 MG/DL (ref 0.5–1.3)
EGFRCR SERPLBLD CKD-EPI 2021: 42 ML/MIN/1.73M*2
GLUCOSE SERPL-MCNC: 103 MG/DL (ref 74–99)
POTASSIUM SERPL-SCNC: 5 MMOL/L (ref 3.5–5.3)
PROT SERPL-MCNC: 6.5 G/DL (ref 6.4–8.2)
SODIUM SERPL-SCNC: 132 MMOL/L (ref 136–145)

## 2024-02-19 ENCOUNTER — TELEPHONE (OUTPATIENT)
Dept: HEMATOLOGY/ONCOLOGY | Facility: CLINIC | Age: 74
End: 2024-02-19
Payer: MEDICARE

## 2024-02-19 DIAGNOSIS — C67.9 MALIGNANT NEOPLASM OF URINARY BLADDER, UNSPECIFIED SITE (MULTI): ICD-10-CM

## 2024-02-19 NOTE — TELEPHONE ENCOUNTER
Per Dr. Jarquin,  Please inform that kidney function has worsened, make sure to drink plenty of fluids, recheck BMP in 4 weeks.    Patient notified.  Patient scheduled to return on 03/22/24 but don't see any labs for appointment or recheck.

## 2024-03-12 PROBLEM — I80.8: Status: ACTIVE | Noted: 2023-11-17

## 2024-03-12 RX ORDER — NITROGLYCERIN 0.4 MG/1
TABLET SUBLINGUAL
COMMUNITY
Start: 2023-12-17

## 2024-03-22 ENCOUNTER — OFFICE VISIT (OUTPATIENT)
Dept: HEMATOLOGY/ONCOLOGY | Facility: CLINIC | Age: 74
End: 2024-03-22
Payer: MEDICARE

## 2024-03-22 VITALS
HEART RATE: 75 BPM | RESPIRATION RATE: 16 BRPM | SYSTOLIC BLOOD PRESSURE: 130 MMHG | OXYGEN SATURATION: 99 % | BODY MASS INDEX: 38.03 KG/M2 | DIASTOLIC BLOOD PRESSURE: 77 MMHG | WEIGHT: 221.67 LBS | TEMPERATURE: 96.6 F

## 2024-03-22 DIAGNOSIS — R91.1 PULMONARY NODULE: ICD-10-CM

## 2024-03-22 DIAGNOSIS — C67.9 MALIGNANT NEOPLASM OF URINARY BLADDER, UNSPECIFIED SITE (MULTI): ICD-10-CM

## 2024-03-22 PROCEDURE — 1036F TOBACCO NON-USER: CPT | Performed by: INTERNAL MEDICINE

## 2024-03-22 PROCEDURE — 3075F SYST BP GE 130 - 139MM HG: CPT | Performed by: INTERNAL MEDICINE

## 2024-03-22 PROCEDURE — 99215 OFFICE O/P EST HI 40 MIN: CPT | Performed by: INTERNAL MEDICINE

## 2024-03-22 PROCEDURE — 1159F MED LIST DOCD IN RCRD: CPT | Performed by: INTERNAL MEDICINE

## 2024-03-22 PROCEDURE — 3078F DIAST BP <80 MM HG: CPT | Performed by: INTERNAL MEDICINE

## 2024-03-22 PROCEDURE — 1126F AMNT PAIN NOTED NONE PRSNT: CPT | Performed by: INTERNAL MEDICINE

## 2024-03-22 ASSESSMENT — PAIN SCALES - GENERAL: PAINLEVEL: 0-NO PAIN

## 2024-03-22 NOTE — PROGRESS NOTES
PET scan for Hx of bladder CA, abnormal CT showing lymphadenopathy to be done at [facility].  Previous imaging done at [facility].  Follow-up in April. Call back instructions reviewed.  Patient verbalized understanding.

## 2024-03-22 NOTE — PROGRESS NOTES
Patient ID: Keenan Reveles is a 73 y.o. male.  Referring Physician: Eladio Jarquin MD  [address], [place] [address]  Primary Care Provider: Syed Brewster MD    ORDERS & PATIENT INSTRUCTIONS:  Please do PET scan for history of bladder cancer, now with development of left periotic lymph node concerning for metastatic disease.  Please do it at [facility]  -Please compare with previous PET scan,  If PET scan is positive, will do biopsy of this lymphadenopathy at [facility]    Return for follow-up in 1 week after PET scan and possible biopsy  No labs at that time        ASSESSMENT, PROBLEM LIST, DECISION MAKING, PLAN.    Initial History of bladder cancer initially superficial diagnosed sometime in 2019 and was treated with TURBT and later received intravesicular BCG x12 treatment which was discontinued in 2022.    Muscle invasive bladder carcinoma diagnosed in August 2023, staging work-up revealed 8 mm left iliac chain lymph node concerning for metastatic lymph node in addition to lobular enhancing urinary bladder mass along the left lateral and posterior wall and 6 mm pulmonary nodule in superior segment of right lower lobe 3 mm pulmonary nodule in right lower lobe.  PET scan is currently pending  Patient has declined surgery and is interested in bladder preservation and has been seen by Dr. Negro    Patient was started on bladder preservation concomitant chemoradiation using cisplatin and Taxol started on October 9, 2023 and finished last chemotherapy on November 27, 2023, patient finished radiation therapy on December 4, 2023  Patient had a cystoscopy done by Dr. Clifton Nguyen on February 20, 2024 showed darker colored brown tumor on the left lateral wall which was removed and there was no evidence of malignancy        PAST MEDICAL HISTORY:       gouty arthritis, hearing difficulty, coronary artery disease status post stent and has been followed by Dr. Armstrong,  hypertension,        INTERVAL HISTORY :     Patient is here today for follow-up on bladder cancers, who is status post chemoradiation from October 9, 2023 and finished on December 4, 2023.  He received radiation under care of Dr. Negro.    Patient had last cystoscopy by Dr. Clifton Nguyen in February 2024 and was negative.        PHYSICAL EXAM:  General: Conscious, alert, oriented x3, not in acute distress.  HEENT:    No icterus.    Chest:Bilateral symmetrical,     CVS:  S1, S2.    Abdomen:  Soft, no palpable mass   Extremities: No clubbing, cyanosis,     Skin: No petechial rash.        ASSESSMENT & PLAN:    Muscle invasive bladder carcinoma diagnosed in August 2023, staging work-up revealed 8 mm left iliac chain lymph node concerning for metastatic lymph node in addition to lobular enhancing urinary bladder mass along the left lateral and posterior wall and 6 mm pulmonary nodule in superior segment of right lower lobe 3 mm pulmonary nodule in right lower lobe.  PET scan showed no pulmonary nodule activity, there was FDG avid left iliac chain lymph node highly suspicious for metastatic disease. left iliac lymph node was in the radiation field     Patient received bladder preservation concomitant chemoradiation using cisplatin and Taxol from October 9, 2023 and finished ninth week of chemotherapy on November 27, 2023 and last radiation therapy was on December 4, 2023.    Follow-up cystoscopy and biopsy in February 2024 was negative by Dr. Clifton Nguyen    Follow-up CAT scan showed bladder tumor has resolved, pulmonary nodules are stable no other pelvic lymphadenopathy seen although there is interval development of left periaortic lymphadenopathy concerning for metastatic disease,  -At this time we will do a PET scan and possible biopsy of this lymph node if possible, if not may consider placing him on immunotherapy Keytruda with or without Enfortumab       Patient had right upper extremity swelling and was seen in the  "emergency room in [place] on 17 November 2023 and there was superficial venous thrombosis in the right basilic vein and right cephalic vein for which she was placed on Eliquis and was discontinued in end of December 2023    Discussed with patient and wife            VITALS:   2.14 meters squared /77   Pulse 75   Temp 35.9 °C (96.6 °F)   Resp 16   Wt 101 kg (221 lb 10.8 oz)   SpO2 99%   BMI 38.03 kg/m²     LABS:    CBC:  Recent Labs     02/13/24  1427 11/27/23  0937 11/24/23  1304 11/20/23  0959 11/17/23  1305   WBC 5.5 2.4* 3.0* 2.9* 4.7   HGB 11.4* 8.4* 10.3* 8.5* 11.2*   HCT 34.7* 25.0* 31.0* 25.8* 33.5*    159 161 123* 130*   * 95 96 95 95       CMP:  Recent Labs     02/13/24  1427 11/27/23  0937 11/24/23  1304 11/17/23  1305 11/10/23  1133 11/02/23  1138 10/27/23  1159   * 132* 134* 132* 134* 136 134*   K 5.0 4.9 4.8 4.9 5.0 4.8 4.6    102 101 100 98 101 102   CO2 19* 20* 22 24 22 24 24   ANIONGAP 17 15 16 13 19 16 13   BUN 38* 30* 36* 41* 41* 38* 35*   CREATININE 1.69* 1.31* 1.43* 1.37* 1.55* 1.41* 1.38*   EGFR 42* 58* 52* 55* 47* 53* 54*   MG  --  1.39*  --  1.68 1.52*  1.48* 1.55* 1.37*  1.39*     Recent Labs     02/13/24  1427 11/27/23  0937 11/24/23  1304 11/17/23  1305 11/10/23  1133   ALBUMIN 4.1 3.8 4.0 4.1 3.9   ALKPHOS 123 95 85 110 94   ALT 16 20 22 22 22   AST 16 20 18 16 16   BILITOT 0.7 0.3 0.4 0.4 0.4       HEME/ENDO:  Recent Labs     11/27/23  0937 08/25/23  1245 05/01/20  0910   FERRITIN 1,062* 361*  --    IRONSAT 30 23*  --    TSH  --  2.02 2.60   [id number]  --   --  589        MICRO: No results for input(s): \"ESR\", \"CRP\", \"PROCAL\" in the last 09791 hours.  No results found for the last 90 days.        TUMOR MARKERS:  No results found for: \"LABCA2\", \"CEA\", \"\", \"PSA\", \"AFPTM\", \"HCGTM\", \"\"             IMAGING:         No image results found.       Current Outpatient Medications   Medication Sig Dispense Refill    allopurinol (Zyloprim) 100 mg " tablet Take 1 tablet (100 mg) by mouth 2 times a day.      aspirin 81 mg EC tablet Take 1 tablet (81 mg) by mouth once daily.      atorvastatin (Lipitor) 20 mg tablet Take 1 tablet (20 mg) by mouth once daily.      Cinnamon 500 mg capsule Take by mouth.      lisinopril 20 mg tablet Take 1 tablet (20 mg) by mouth once daily.      metoprolol succinate XL (Toprol-XL) 50 mg 24 hr tablet Take 1 tablet (50 mg) by mouth once daily.      nitroglycerin (Nitrostat) 0.4 mg SL tablet       triamterene-hydrochlorothiazid (Dyazide) 37.5-25 mg capsule Take 1 capsule by mouth every other day.      magnesium, as gluconate, (Magonate) 27 mg magnesium (500 mg) tablet Take 1 tablet (27 mg) by mouth 2 times a day.       No current facility-administered medications for this visit.            FAMILY HISTORY:   No family history on file.     ALLERGY: Penicillins    SOCIAL HISTORY: He  reports no history of alcohol use. He  reports no history of drug use.            Medication reviewed in e-chart  Patient is monitored for medication toxicity  labs reviewed and interpreted independently, X rays independently reviewed  Notes from other physicians involved in care were reviewed    Charting was completed using voice recognition technology and may include unintended errors.    TEX KINCAID MD, OLIVIA.    Tyrone Shane Master Clinician in Hematology and Oncology  Medical Director, [facility] at [facility].  [place] office  Phone [phone]  Fax      [phone]  [facility] /[place].  Phone [phone]  Fax     [phone]

## 2024-04-02 ENCOUNTER — TELEPHONE (OUTPATIENT)
Dept: HEMATOLOGY/ONCOLOGY | Facility: CLINIC | Age: 74
End: 2024-04-02
Payer: MEDICARE

## 2024-04-02 NOTE — TELEPHONE ENCOUNTER
Call pastor hammond to patient.  PET results reviewed by phone.  Notified of providers recommendation for CT guided bx of supraclavicular or retroperitoneal lymph node with Yudelka at [facility].  Wife and patient notified of this and agreeable.  Will bump follow-up to accommodate as well.  Call back instructions reviewed.  Patient verbalized understanding.

## 2024-04-09 ENCOUNTER — APPOINTMENT (OUTPATIENT)
Dept: HEMATOLOGY/ONCOLOGY | Facility: CLINIC | Age: 74
End: 2024-04-09
Payer: MEDICARE

## 2024-04-26 ENCOUNTER — OFFICE VISIT (OUTPATIENT)
Dept: HEMATOLOGY/ONCOLOGY | Facility: CLINIC | Age: 74
End: 2024-04-26
Payer: MEDICARE

## 2024-04-26 VITALS
OXYGEN SATURATION: 99 % | TEMPERATURE: 97.9 F | WEIGHT: 214.4 LBS | BODY MASS INDEX: 36.78 KG/M2 | DIASTOLIC BLOOD PRESSURE: 80 MMHG | SYSTOLIC BLOOD PRESSURE: 133 MMHG | RESPIRATION RATE: 16 BRPM | HEART RATE: 86 BPM

## 2024-04-26 DIAGNOSIS — R91.1 PULMONARY NODULE: ICD-10-CM

## 2024-04-26 DIAGNOSIS — C67.9 MALIGNANT NEOPLASM OF URINARY BLADDER, UNSPECIFIED SITE (MULTI): Primary | ICD-10-CM

## 2024-04-26 PROCEDURE — 1159F MED LIST DOCD IN RCRD: CPT | Performed by: INTERNAL MEDICINE

## 2024-04-26 PROCEDURE — 1126F AMNT PAIN NOTED NONE PRSNT: CPT | Performed by: INTERNAL MEDICINE

## 2024-04-26 PROCEDURE — 99215 OFFICE O/P EST HI 40 MIN: CPT | Performed by: INTERNAL MEDICINE

## 2024-04-26 PROCEDURE — 99417 PROLNG OP E/M EACH 15 MIN: CPT | Performed by: INTERNAL MEDICINE

## 2024-04-26 PROCEDURE — 3075F SYST BP GE 130 - 139MM HG: CPT | Performed by: INTERNAL MEDICINE

## 2024-04-26 PROCEDURE — G2212 PROLONG OUTPT/OFFICE VIS: HCPCS | Performed by: INTERNAL MEDICINE

## 2024-04-26 PROCEDURE — 3079F DIAST BP 80-89 MM HG: CPT | Performed by: INTERNAL MEDICINE

## 2024-04-26 RX ORDER — ALBUTEROL SULFATE 0.83 MG/ML
3 SOLUTION RESPIRATORY (INHALATION) AS NEEDED
Status: CANCELLED | OUTPATIENT
Start: 2024-05-11

## 2024-04-26 RX ORDER — PROCHLORPERAZINE MALEATE 10 MG
10 TABLET ORAL EVERY 6 HOURS PRN
Status: CANCELLED | OUTPATIENT
Start: 2024-05-11

## 2024-04-26 RX ORDER — EPINEPHRINE 0.3 MG/.3ML
0.3 INJECTION SUBCUTANEOUS EVERY 5 MIN PRN
Status: CANCELLED | OUTPATIENT
Start: 2024-05-03

## 2024-04-26 RX ORDER — ONDANSETRON HYDROCHLORIDE 2 MG/ML
8 INJECTION, SOLUTION INTRAVENOUS ONCE
Status: CANCELLED | OUTPATIENT
Start: 2024-05-11

## 2024-04-26 RX ORDER — DIPHENHYDRAMINE HYDROCHLORIDE 50 MG/ML
50 INJECTION INTRAMUSCULAR; INTRAVENOUS AS NEEDED
Status: CANCELLED | OUTPATIENT
Start: 2024-05-03

## 2024-04-26 RX ORDER — ALBUTEROL SULFATE 0.83 MG/ML
3 SOLUTION RESPIRATORY (INHALATION) AS NEEDED
Status: CANCELLED | OUTPATIENT
Start: 2024-05-03

## 2024-04-26 RX ORDER — FAMOTIDINE 10 MG/ML
20 INJECTION INTRAVENOUS ONCE AS NEEDED
Status: CANCELLED | OUTPATIENT
Start: 2024-05-11

## 2024-04-26 RX ORDER — PROCHLORPERAZINE EDISYLATE 5 MG/ML
10 INJECTION INTRAMUSCULAR; INTRAVENOUS EVERY 6 HOURS PRN
Status: CANCELLED | OUTPATIENT
Start: 2024-05-11

## 2024-04-26 RX ORDER — PROCHLORPERAZINE EDISYLATE 5 MG/ML
10 INJECTION INTRAMUSCULAR; INTRAVENOUS EVERY 6 HOURS PRN
Status: CANCELLED | OUTPATIENT
Start: 2024-05-03

## 2024-04-26 RX ORDER — FAMOTIDINE 10 MG/ML
20 INJECTION INTRAVENOUS ONCE AS NEEDED
Status: CANCELLED | OUTPATIENT
Start: 2024-05-03

## 2024-04-26 RX ORDER — DIPHENHYDRAMINE HYDROCHLORIDE 50 MG/ML
50 INJECTION INTRAMUSCULAR; INTRAVENOUS AS NEEDED
Status: CANCELLED | OUTPATIENT
Start: 2024-05-11

## 2024-04-26 RX ORDER — PROCHLORPERAZINE MALEATE 10 MG
10 TABLET ORAL EVERY 6 HOURS PRN
Status: CANCELLED | OUTPATIENT
Start: 2024-05-03

## 2024-04-26 RX ORDER — ONDANSETRON HYDROCHLORIDE 2 MG/ML
8 INJECTION, SOLUTION INTRAVENOUS ONCE
Status: CANCELLED | OUTPATIENT
Start: 2024-05-03

## 2024-04-26 RX ORDER — EPINEPHRINE 0.3 MG/.3ML
0.3 INJECTION SUBCUTANEOUS EVERY 5 MIN PRN
Status: CANCELLED | OUTPATIENT
Start: 2024-05-11

## 2024-04-26 ASSESSMENT — PAIN SCALES - GENERAL: PAINLEVEL: 0-NO PAIN

## 2024-04-26 NOTE — PROGRESS NOTES
Patient ID: Keenan Reveles is a 73 y.o. male.  Referring Physician: Eladio Jarquin MD  [address] [facility], [place] [address]  Primary Care Provider: Syed Brewster MD    ORDERS & PATIENT INSTRUCTIONS:  Please give information about Enfortumab and Keytruda   Start approval   Port placement   Chemo class     Return for follow-up in 7-10 days to possibly start treatment  CBC, CMP, TSH, cortisol, ACTH in 1 week         ASSESSMENT, PROBLEM LIST, DECISION MAKING, PLAN.    Initial History of bladder cancer initially superficial diagnosed sometime in 2019 and was treated with TURBT and later received intravesicular BCG x12 treatment which was discontinued in 2022.    Muscle invasive bladder carcinoma diagnosed in August 2023, staging work-up revealed 8 mm left iliac chain lymph node concerning for metastatic lymph node in addition to lobular enhancing urinary bladder mass along the left lateral and posterior wall and 6 mm pulmonary nodule in superior segment of right lower lobe 3 mm pulmonary nodule in right lower lobe.  PET scan is currently pending  Patient has declined surgery and is interested in bladder preservation and has been seen by Dr. Negro    Patient was started on bladder preservation concomitant chemoradiation using cisplatin and Taxol started on October 9, 2023 and finished last chemotherapy on November 27, 2023, patient finished radiation therapy on December 4, 2023  Patient had a cystoscopy done by Dr. Clifton Nguyen on February 20, 2024 showed darker colored brown tumor on the left lateral wall which was removed and there was no evidence of malignancy    Patient developed metastatic lymphadenopathy, supraclavicular lymph node biopsy in April 2024 was consistent with recurrent/metastatic urothelial carcinoma patient to start Enfortumab with Keytruda from May 2024    PAST MEDICAL HISTORY:       gouty arthritis, hearing difficulty, coronary artery disease status post stent and  has been followed by Dr. Armstrong, hypertension,        INTERVAL HISTORY :   Patient returns today for follow-up on bladder carcinoma    PHYSICAL EXAM:  General: Conscious, alert, oriented x3, not in acute distress.  HEENT:    No icterus.    Chest:Bilateral symmetrical,     CVS:  S1, S2.    Abdomen:  Soft, no palpable mass   Extremities: No clubbing, cyanosis,     Skin: No petechial rash.        ASSESSMENT & PLAN:    Muscle invasive bladder carcinoma diagnosed in August 2023, staging work-up revealed 8 mm left iliac chain lymph node concerning for metastatic lymph node in addition to lobular enhancing urinary bladder mass along the left lateral and posterior wall and 6 mm pulmonary nodule in superior segment of right lower lobe 3 mm pulmonary nodule in right lower lobe.  PET scan showed no pulmonary nodule activity, there was FDG avid left iliac chain lymph node highly suspicious for metastatic disease. left iliac lymph node was in the radiation field     Patient received bladder preservation concomitant chemoradiation using cisplatin and Taxol from October 9, 2023 and finished ninth week of chemotherapy on November 27, 2023 and last radiation therapy was on December 4, 2023.    Follow-up cystoscopy and biopsy in February 2024 was negative by Dr. Clifton Nguyen    Follow-up CAT scan showed bladder tumor has resolved, pulmonary nodules are stable no other pelvic lymphadenopathy seen although there is interval development of left periaortic lymphadenopathy concerning for metastatic disease,    PET scan done on 28 March 2024 at [facility] showed FDG avid lymph node involving para-aortic retroperitoneal lymph node extending along the left common iliac vasculature, retrocrural lymph node paraesophageal and mediastinal lymph node as well as left supraclavicular lymph node compatible with metastatic disease, patient had a supraclavicular lymph node biopsy done at [facility] on 15 April 2024 was positive for  metastatic urothelial carcinoma.    I had a long discussion with the patient and wife and seriousness of the condition were discussed, patient has stage IV disease which is incurable in nature and treatment will be palliative and will consider starting him on immunotherapy Keytruda with  Enfortumab Vedotin.  Risk-benefit and alternative were discussed, patient is going to think about his option will give chemotherapy side effect information packet, chemo class, port placement,  Risk benefit and side effect of pharmacological treatment for malignancy  including possibility of life-threatening side effects were discussed.  Continue intensive monitoring for toxicity and agreed to be compliant for follow-up.  We will also require close monitoring for cytopenia, electrolyte imbalance, liver and renal function and/or imaging.    Discussed with patient and wife        VITALS:   2.1 meters squared /80   Pulse 86   Temp 36.6 °C (97.9 °F)   Resp 16   Wt 97.3 kg (214 lb 6.4 oz)   SpO2 99%   BMI 36.78 kg/m²     LABS:    CBC:  Recent Labs     02/13/24  1427 11/27/23  0937 11/24/23  1304 11/20/23  0959 11/17/23  1305   WBC 5.5 2.4* 3.0* 2.9* 4.7   HGB 11.4* 8.4* 10.3* 8.5* 11.2*   HCT 34.7* 25.0* 31.0* 25.8* 33.5*    159 161 123* 130*   * 95 96 95 95       CMP:  Recent Labs     02/13/24  1427 11/27/23  0937 11/24/23  1304 11/17/23  1305 11/10/23  1133 11/02/23  1138 10/27/23  1159   * 132* 134* 132* 134* 136 134*   K 5.0 4.9 4.8 4.9 5.0 4.8 4.6    102 101 100 98 101 102   CO2 19* 20* 22 24 22 24 24   ANIONGAP 17 15 16 13 19 16 13   BUN 38* 30* 36* 41* 41* 38* 35*   CREATININE 1.69* 1.31* 1.43* 1.37* 1.55* 1.41* 1.38*   EGFR 42* 58* 52* 55* 47* 53* 54*   MG  --  1.39*  --  1.68 1.52*  1.48* 1.55* 1.37*  1.39*     Recent Labs     02/13/24  1427 11/27/23  0937 11/24/23  1304 11/17/23  1305 11/10/23  1133   ALBUMIN 4.1 3.8 4.0 4.1 3.9   ALKPHOS 123 95 85 110 94   ALT 16 20 22 22 22   AST 16 20  "18 16 16   BILITOT 0.7 0.3 0.4 0.4 0.4       HEME/ENDO:  Recent Labs     11/27/23  0937 08/25/23  1245 05/01/20  0910   FERRITIN 1,062* 361*  --    IRONSAT 30 23*  --    TSH  --  2.02 2.60   [id number]  --   --  589        MICRO: No results for input(s): \"ESR\", \"CRP\", \"PROCAL\" in the last 87920 hours.  No results found for the last 90 days.        TUMOR MARKERS:  No results found for: \"LABCA2\", \"CEA\", \"\", \"PSA\", \"AFPTM\", \"HCGTM\", \"\"             IMAGING:         No image results found.       Current Outpatient Medications   Medication Sig Dispense Refill    allopurinol (Zyloprim) 100 mg tablet Take 1 tablet (100 mg) by mouth 2 times a day.      aspirin 81 mg EC tablet Take 1 tablet (81 mg) by mouth once daily.      atorvastatin (Lipitor) 20 mg tablet Take 1 tablet (20 mg) by mouth once daily.      Cinnamon 500 mg capsule Take by mouth.      lisinopril 20 mg tablet Take 1 tablet (20 mg) by mouth once daily.      magnesium, as gluconate, (Magonate) 27 mg magnesium (500 mg) tablet Take 1 tablet (27 mg) by mouth 2 times a day.      metoprolol succinate XL (Toprol-XL) 50 mg 24 hr tablet Take 1 tablet (50 mg) by mouth once daily.      nitroglycerin (Nitrostat) 0.4 mg SL tablet       triamterene-hydrochlorothiazid (Dyazide) 37.5-25 mg capsule Take 1 capsule by mouth every other day.       No current facility-administered medications for this visit.            FAMILY HISTORY:   No family history on file.     ALLERGY: Penicillins    SOCIAL HISTORY: He  reports no history of alcohol use. He  reports no history of drug use.            Medication reviewed in e-chart  Patient is monitored for medication toxicity  labs reviewed and interpreted independently, X rays independently reviewed  Notes from other physicians involved in care were reviewed  Spent 70 minutes total time including both face-to-face examining patient, discussing treatment options, educating and counseling as well as non-face-to-face time including " previsit and post visit time including reviewing test results, communicating with other healthcare providers involved, coordinating care and documenting patient's care in EMR.  Charting was completed using voice recognition technology and may include unintended errors.    TEX KINCAID MD, OLIVIA.    Tyrone Shane Master Clinician in Hematology and Oncology  Medical Director, [facility] Center at [facility].  [place] office  Phone [phone]  Fax      [phone]  [facility] /[place].  Phone [phone]  Fax     [phone]

## 2024-04-26 NOTE — PROGRESS NOTES
Port insertion.  Orders in process for [facility].  Teaching provided regarding CVAD.  Teaching and PI provided regarding Padcev and Keytruda.  Teaching also provided regarding immunotherapy.   Orders entered by provider.  Reviewed chemo class with patient and wife.  Patient and wife have previously viewed this.  Patient would prefer to start 5/10.  Will arrange for labs and port flush 5/9 for treatment 5/10.  Nutrition score of 3.  Call back instructions reviewed.  Patient verbalized understanding.

## 2024-05-02 ENCOUNTER — PATIENT OUTREACH (OUTPATIENT)
Dept: HEMATOLOGY/ONCOLOGY | Facility: CLINIC | Age: 74
End: 2024-05-02
Payer: MEDICARE

## 2024-05-02 SDOH — ECONOMIC STABILITY: FOOD INSECURITY: WITHIN THE PAST 12 MONTHS, YOU WORRIED THAT YOUR FOOD WOULD RUN OUT BEFORE YOU GOT MONEY TO BUY MORE.: NEVER TRUE

## 2024-05-02 SDOH — ECONOMIC STABILITY: INCOME INSECURITY: IN THE LAST 12 MONTHS, WAS THERE A TIME WHEN YOU WERE NOT ABLE TO PAY THE MORTGAGE OR RENT ON TIME?: NO

## 2024-05-02 SDOH — ECONOMIC STABILITY: TRANSPORTATION INSECURITY
IN THE PAST 12 MONTHS, HAS THE LACK OF TRANSPORTATION KEPT YOU FROM MEDICAL APPOINTMENTS OR FROM GETTING MEDICATIONS?: NO

## 2024-05-02 SDOH — HEALTH STABILITY: PHYSICAL HEALTH: ON AVERAGE, HOW MANY DAYS PER WEEK DO YOU ENGAGE IN MODERATE TO STRENUOUS EXERCISE (LIKE A BRISK WALK)?: 2 DAYS

## 2024-05-02 SDOH — ECONOMIC STABILITY: FOOD INSECURITY: WITHIN THE PAST 12 MONTHS, THE FOOD YOU BOUGHT JUST DIDN'T LAST AND YOU DIDN'T HAVE MONEY TO GET MORE.: NEVER TRUE

## 2024-05-02 SDOH — ECONOMIC STABILITY: HOUSING INSECURITY
IN THE LAST 12 MONTHS, WAS THERE A TIME WHEN YOU DID NOT HAVE A STEADY PLACE TO SLEEP OR SLEPT IN A SHELTER (INCLUDING NOW)?: NO

## 2024-05-02 SDOH — ECONOMIC STABILITY: GENERAL
WHICH OF THE FOLLOWING DO YOU KNOW HOW TO USE AND HAVE ACCESS TO EVERY DAY? (CHOOSE ALL THAT APPLY): DESKTOP COMPUTER, LAPTOP COMPUTER, OR TABLET WITH BROADBAND INTERNET CONNECTION;SMARTPHONE WITH CELLULAR DATA PLAN

## 2024-05-02 SDOH — HEALTH STABILITY: PHYSICAL HEALTH: ON AVERAGE, HOW MANY MINUTES DO YOU ENGAGE IN EXERCISE AT THIS LEVEL?: 20 MIN

## 2024-05-02 SDOH — ECONOMIC STABILITY: TRANSPORTATION INSECURITY
IN THE PAST 12 MONTHS, HAS LACK OF TRANSPORTATION KEPT YOU FROM MEETINGS, WORK, OR FROM GETTING THINGS NEEDED FOR DAILY LIVING?: NO

## 2024-05-02 ASSESSMENT — SOCIAL DETERMINANTS OF HEALTH (SDOH)
HOW OFTEN DO YOU GET TOGETHER WITH FRIENDS OR RELATIVES?: MORE THAN THREE TIMES A WEEK
IN THE PAST 12 MONTHS, HAS THE ELECTRIC, GAS, OIL, OR WATER COMPANY THREATENED TO SHUT OFF SERVICE IN YOUR HOME?: NO
HOW OFTEN DO YOU ATTENT MEETINGS OF THE CLUB OR ORGANIZATION YOU BELONG TO?: NEVER
IN A TYPICAL WEEK, HOW MANY TIMES DO YOU TALK ON THE PHONE WITH FAMILY, FRIENDS, OR NEIGHBORS?: MORE THAN THREE TIMES A WEEK
HOW OFTEN DO YOU ATTEND CHURCH OR RELIGIOUS SERVICES?: NEVER
DO YOU BELONG TO ANY CLUBS OR ORGANIZATIONS SUCH AS CHURCH GROUPS UNIONS, FRATERNAL OR ATHLETIC GROUPS, OR SCHOOL GROUPS?: NO
HOW HARD IS IT FOR YOU TO PAY FOR THE VERY BASICS LIKE FOOD, HOUSING, MEDICAL CARE, AND HEATING?: NOT HARD AT ALL

## 2024-05-02 ASSESSMENT — LIFESTYLE VARIABLES
HOW OFTEN DO YOU HAVE SIX OR MORE DRINKS ON ONE OCCASION: NEVER
SKIP TO QUESTIONS 9-10: 1
HOW MANY STANDARD DRINKS CONTAINING ALCOHOL DO YOU HAVE ON A TYPICAL DAY: PATIENT DOES NOT DRINK
HOW OFTEN DO YOU HAVE A DRINK CONTAINING ALCOHOL: NEVER
AUDIT-C TOTAL SCORE: 0

## 2024-05-02 ASSESSMENT — PATIENT HEALTH QUESTIONNAIRE - PHQ9
SUM OF ALL RESPONSES TO PHQ9 QUESTIONS 1 & 2: 0
1. LITTLE INTEREST OR PLEASURE IN DOING THINGS: NOT AT ALL
2. FEELING DOWN, DEPRESSED OR HOPELESS: NOT AT ALL

## 2024-05-02 NOTE — PROGRESS NOTES
Call placed to patient to follow-up regarding new treatment regimen.  Patient unable to hear phone.  Wife assisted with call.  SDOH evaluated by RN.  No barriers noted.  Patient having port inserted at [facility] tomorrow.  Redlist reviewed.  Reviewed teaching regarding Padcev and Keytruda.  Call back instructions reviewed.  Patient verbalized understanding.

## 2024-05-04 ENCOUNTER — PATIENT OUTREACH (OUTPATIENT)
Dept: HEMATOLOGY/ONCOLOGY | Facility: HOSPITAL | Age: 74
End: 2024-05-04
Payer: MEDICARE

## 2024-05-09 ENCOUNTER — LAB (OUTPATIENT)
Dept: LAB | Facility: CLINIC | Age: 74
End: 2024-05-09
Payer: MEDICARE

## 2024-05-10 ENCOUNTER — OFFICE VISIT (OUTPATIENT)
Dept: HEMATOLOGY/ONCOLOGY | Facility: CLINIC | Age: 74
End: 2024-05-10
Payer: MEDICARE

## 2024-05-10 ENCOUNTER — INFUSION (OUTPATIENT)
Dept: HEMATOLOGY/ONCOLOGY | Facility: CLINIC | Age: 74
End: 2024-05-10
Payer: MEDICARE

## 2024-05-10 ENCOUNTER — SOCIAL WORK (OUTPATIENT)
Dept: CASE MANAGEMENT | Facility: HOSPITAL | Age: 74
End: 2024-05-10

## 2024-05-10 VITALS — BODY MASS INDEX: 35.55 KG/M2 | WEIGHT: 207.23 LBS

## 2024-05-10 VITALS
RESPIRATION RATE: 16 BRPM | WEIGHT: 200.51 LBS | TEMPERATURE: 96.6 F | SYSTOLIC BLOOD PRESSURE: 127 MMHG | OXYGEN SATURATION: 100 % | DIASTOLIC BLOOD PRESSURE: 76 MMHG | BODY MASS INDEX: 34.4 KG/M2 | HEART RATE: 81 BPM

## 2024-05-10 DIAGNOSIS — R91.1 PULMONARY NODULE: ICD-10-CM

## 2024-05-10 DIAGNOSIS — C67.9 MALIGNANT NEOPLASM OF URINARY BLADDER, UNSPECIFIED SITE (MULTI): ICD-10-CM

## 2024-05-10 DIAGNOSIS — R53.83 OTHER FATIGUE: ICD-10-CM

## 2024-05-10 PROCEDURE — 96375 TX/PRO/DX INJ NEW DRUG ADDON: CPT | Mod: INF

## 2024-05-10 PROCEDURE — 82024 ASSAY OF ACTH: CPT | Performed by: INTERNAL MEDICINE

## 2024-05-10 PROCEDURE — 3074F SYST BP LT 130 MM HG: CPT | Performed by: INTERNAL MEDICINE

## 2024-05-10 PROCEDURE — 96417 CHEMO IV INFUS EACH ADDL SEQ: CPT

## 2024-05-10 PROCEDURE — 1126F AMNT PAIN NOTED NONE PRSNT: CPT | Performed by: INTERNAL MEDICINE

## 2024-05-10 PROCEDURE — 99215 OFFICE O/P EST HI 40 MIN: CPT | Performed by: INTERNAL MEDICINE

## 2024-05-10 PROCEDURE — 2500000004 HC RX 250 GENERAL PHARMACY W/ HCPCS (ALT 636 FOR OP/ED): Performed by: INTERNAL MEDICINE

## 2024-05-10 PROCEDURE — 2500000004 HC RX 250 GENERAL PHARMACY W/ HCPCS (ALT 636 FOR OP/ED): Mod: JZ,JG | Performed by: INTERNAL MEDICINE

## 2024-05-10 PROCEDURE — 96413 CHEMO IV INFUSION 1 HR: CPT

## 2024-05-10 PROCEDURE — 3078F DIAST BP <80 MM HG: CPT | Performed by: INTERNAL MEDICINE

## 2024-05-10 PROCEDURE — 1159F MED LIST DOCD IN RCRD: CPT | Performed by: INTERNAL MEDICINE

## 2024-05-10 RX ORDER — ONDANSETRON HYDROCHLORIDE 2 MG/ML
8 INJECTION, SOLUTION INTRAVENOUS ONCE
Status: COMPLETED | OUTPATIENT
Start: 2024-05-10 | End: 2024-05-10

## 2024-05-10 RX ORDER — HEPARIN 100 UNIT/ML
500 SYRINGE INTRAVENOUS AS NEEDED
Status: CANCELLED | OUTPATIENT
Start: 2024-05-10

## 2024-05-10 RX ORDER — ALBUTEROL SULFATE 0.83 MG/ML
3 SOLUTION RESPIRATORY (INHALATION) AS NEEDED
Status: DISCONTINUED | OUTPATIENT
Start: 2024-05-10 | End: 2024-05-10 | Stop reason: HOSPADM

## 2024-05-10 RX ORDER — DIPHENHYDRAMINE HYDROCHLORIDE 50 MG/ML
50 INJECTION INTRAMUSCULAR; INTRAVENOUS AS NEEDED
Status: DISCONTINUED | OUTPATIENT
Start: 2024-05-10 | End: 2024-05-10 | Stop reason: HOSPADM

## 2024-05-10 RX ORDER — PROCHLORPERAZINE EDISYLATE 5 MG/ML
10 INJECTION INTRAMUSCULAR; INTRAVENOUS EVERY 6 HOURS PRN
Status: DISCONTINUED | OUTPATIENT
Start: 2024-05-10 | End: 2024-05-10 | Stop reason: HOSPADM

## 2024-05-10 RX ORDER — HEPARIN SODIUM,PORCINE/PF 10 UNIT/ML
50 SYRINGE (ML) INTRAVENOUS AS NEEDED
Status: CANCELLED | OUTPATIENT
Start: 2024-05-10

## 2024-05-10 RX ORDER — PROCHLORPERAZINE MALEATE 10 MG
10 TABLET ORAL EVERY 6 HOURS PRN
Status: DISCONTINUED | OUTPATIENT
Start: 2024-05-10 | End: 2024-05-10 | Stop reason: HOSPADM

## 2024-05-10 RX ORDER — HEPARIN 100 UNIT/ML
500 SYRINGE INTRAVENOUS AS NEEDED
Status: DISCONTINUED | OUTPATIENT
Start: 2024-05-10 | End: 2024-05-10 | Stop reason: HOSPADM

## 2024-05-10 RX ORDER — FAMOTIDINE 10 MG/ML
20 INJECTION INTRAVENOUS ONCE AS NEEDED
Status: DISCONTINUED | OUTPATIENT
Start: 2024-05-10 | End: 2024-05-10 | Stop reason: HOSPADM

## 2024-05-10 RX ORDER — EPINEPHRINE 0.3 MG/.3ML
0.3 INJECTION SUBCUTANEOUS EVERY 5 MIN PRN
Status: DISCONTINUED | OUTPATIENT
Start: 2024-05-10 | End: 2024-05-10 | Stop reason: HOSPADM

## 2024-05-10 RX ADMIN — SODIUM CHLORIDE 200 MG: 9 INJECTION, SOLUTION INTRAVENOUS at 14:05

## 2024-05-10 RX ADMIN — ENFORTUMAB VEDOTIN 120 MG: 30 INJECTION, POWDER, LYOPHILIZED, FOR SOLUTION INTRAVENOUS at 13:11

## 2024-05-10 RX ADMIN — HEPARIN 500 UNITS: 100 SYRINGE at 14:56

## 2024-05-10 RX ADMIN — ONDANSETRON 8 MG: 2 INJECTION INTRAMUSCULAR; INTRAVENOUS at 12:44

## 2024-05-10 ASSESSMENT — PAIN SCALES - GENERAL: PAINLEVEL: 0-NO PAIN

## 2024-05-10 NOTE — PROGRESS NOTES
Padcev/Keytruda C#1D#1 today.  Noted recent weight loss.  Patient provided written schedule for remainder of cycle.  Follow-up in 3 weeks with cycle #2.  Patient informed will schedule port draw day prior to each treatment.  Patient follows up with [facility] on Monday to have stiches removed.  Call back instructions reviewed.  Patient verbalized understanding.

## 2024-05-10 NOTE — PROGRESS NOTES
SW met with patient today at [facility] for his first dose. Patient is a 73 year old male with dx bladder cancer. Patient was with spouse at [facility]. SW provided support for first dose. Patient reported that he has been through chemo before and knows what to expect. SW provided contact if he has any needs.

## 2024-05-10 NOTE — PROGRESS NOTES
Patient ID: Keenan Reveles is a 73 y.o. male.  Referring Physician: Eladio Jarquin MD  [address] [facility], [place] [address]  Primary Care Provider: No primary care provider on file.    ORDERS & PATIENT INSTRUCTIONS:    Start Enfortumab with Keytruda    Return for follow-up in 3 weeks   CBC, CMP, TSH, cortisol, ACTH       ASSESSMENT, PROBLEM LIST, DECISION MAKING, PLAN.    Initial History of bladder cancer initially superficial diagnosed sometime in 2019 and was treated with TURBT and later received intravesicular BCG x12 treatment which was discontinued in 2022.    Muscle invasive bladder carcinoma diagnosed in August 2023, staging work-up revealed 8 mm left iliac chain lymph node concerning for metastatic lymph node in addition to lobular enhancing urinary bladder mass along the left lateral and posterior wall and 6 mm pulmonary nodule in superior segment of right lower lobe 3 mm pulmonary nodule in right lower lobe.  PET scan is currently pending  Patient has declined surgery and is interested in bladder preservation and has been seen by Dr. Negro    Patient was started on bladder preservation concomitant chemoradiation using cisplatin and Taxol started on October 9, 2023 and finished last chemotherapy on November 27, 2023, patient finished radiation therapy on December 4, 2023  Patient had a cystoscopy done by Dr. Clifton Nguyen on February 20, 2024 showed darker colored brown tumor on the left lateral wall which was removed and there was no evidence of malignancy    Patient developed metastatic lymphadenopathy, supraclavicular lymph node biopsy in April 2024 was consistent with recurrent/metastatic urothelial carcinoma patient to start Enfortumab with Keytruda from May 10, 2024    PAST MEDICAL HISTORY:       gouty arthritis, hearing difficulty, coronary artery disease status post stent and has been followed by Dr. Armstrong, hypertension,        INTERVAL HISTORY :   Patient returns  today for follow-up on bladder carcinoma    PHYSICAL EXAM:  General: Conscious, alert, oriented x3, not in acute distress.  HEENT:    No icterus.    Chest:Bilateral symmetrical,     CVS:  S1, S2.    Abdomen:  Soft, no palpable mass   Extremities: No clubbing, cyanosis,     Skin: No petechial rash.        ASSESSMENT & PLAN:    Muscle invasive bladder carcinoma diagnosed in August 2023, staging work-up revealed 8 mm left iliac chain lymph node concerning for metastatic lymph node in addition to lobular enhancing urinary bladder mass along the left lateral and posterior wall and 6 mm pulmonary nodule in superior segment of right lower lobe 3 mm pulmonary nodule in right lower lobe.  PET scan showed no pulmonary nodule activity, there was FDG avid left iliac chain lymph node highly suspicious for metastatic disease. left iliac lymph node was in the radiation field     Patient received bladder preservation concomitant chemoradiation using cisplatin and Taxol from October 9, 2023 and finished ninth week of chemotherapy on November 27, 2023 and last radiation therapy was on December 4, 2023.    Follow-up cystoscopy and biopsy in February 2024 was negative by Dr. Clifton Nguyen    Follow-up CAT scan showed bladder tumor has resolved, pulmonary nodules are stable no other pelvic lymphadenopathy seen although there is interval development of left periaortic lymphadenopathy concerning for metastatic disease,    PET scan done on 28 March 2024 at [facility] showed FDG avid lymph node involving para-aortic retroperitoneal lymph node extending along the left common iliac vasculature, retrocrural lymph node paraesophageal and mediastinal lymph node as well as left supraclavicular lymph node compatible with metastatic disease, patient had a supraclavicular lymph node biopsy done at [facility] on 15 April 2024 was positive for metastatic urothelial carcinoma.    Patient has read about Enfortumab that routine and Keytruda  "and he is agreeable to start, he already had a port placed  Scheduled for first treatment today  Risk benefit and side effect of pharmacological treatment for malignancy  including possibility of life-threatening side effects were discussed.  Continue intensive monitoring for toxicity and agreed to be compliant for follow-up.  We will also require close monitoring for cytopenia, electrolyte imbalance, liver and renal function and/or imaging.      Patient was consented      Discussed with patient and wife        VITALS:   2.03 meters squared /76   Pulse 81   Temp 35.9 °C (96.6 °F)   Resp 16   Wt 90.9 kg (200 lb 8.1 oz)   SpO2 100%   BMI 34.40 kg/m²     LABS:    CBC:  Recent Labs     05/09/24  1040 02/13/24  1427 11/27/23  0937 11/24/23  1304 11/20/23  0959   WBC 5.0 5.5 2.4* 3.0* 2.9*   HGB 10.6* 11.4* 8.4* 10.3* 8.5*   HCT 32.3* 34.7* 25.0* 31.0* 25.8*    163 159 161 123*   MCV 94 102* 95 96 95       CMP:  Recent Labs     05/09/24  1040 02/13/24  1427 11/27/23  0937 11/24/23  1304 11/17/23  1305 11/10/23  1133 11/02/23  1138 10/27/23  1159    132* 132* 134* 132* 134* 136 134*   K 5.2 5.0 4.9 4.8 4.9 5.0 4.8 4.6    101 102 101 100 98 101 102   CO2 20* 19* 20* 22 24 22 24 24   ANIONGAP 16 17 15 16 13 19 16 13   BUN 45* 38* 30* 36* 41* 41* 38* 35*   CREATININE 1.48* 1.69* 1.31* 1.43* 1.37* 1.55* 1.41* 1.38*   EGFR 50* 42* 58* 52* 55* 47* 53* 54*   MG  --   --  1.39*  --  1.68 1.52*  1.48* 1.55* 1.37*  1.39*     Recent Labs     05/09/24  1040 02/13/24  1427 11/27/23  0937 11/24/23  1304 11/17/23  1305   ALBUMIN 4.3 4.1 3.8 4.0 4.1   ALKPHOS 135 123 95 85 110   ALT 13 16 20 22 22   AST 16 16 20 18 16   BILITOT 0.5 0.7 0.3 0.4 0.4       HEME/ENDO:  Recent Labs     05/09/24  1040 11/27/23  0937 08/25/23  1245 05/01/20  0910   FERRITIN  --  1,062* 361*  --    IRONSAT  --  30 23*  --    TSH 2.02  --  2.02 2.60   [id number]  --   --   --  589        MICRO: No results for input(s): \"ESR\", " "\"CRP\", \"PROCAL\" in the last 60390 hours.  No results found for the last 90 days.        TUMOR MARKERS:  No results found for: \"LABCA2\", \"CEA\", \"\", \"PSA\", \"AFPTM\", \"HCGTM\", \"\"             IMAGING:         No image results found.       Current Outpatient Medications   Medication Sig Dispense Refill    allopurinol (Zyloprim) 100 mg tablet Take 1 tablet (100 mg) by mouth 2 times a day.      aspirin 81 mg EC tablet Take 1 tablet (81 mg) by mouth once daily.      atorvastatin (Lipitor) 20 mg tablet Take 1 tablet (20 mg) by mouth once daily.      Cinnamon 500 mg capsule Take by mouth.      lisinopril 20 mg tablet Take 1 tablet (20 mg) by mouth once daily.      magnesium, as gluconate, (Magonate) 27 mg magnesium (500 mg) tablet Take 1 tablet (27 mg) by mouth 2 times a day.      metoprolol succinate XL (Toprol-XL) 50 mg 24 hr tablet Take 1 tablet (50 mg) by mouth once daily.      nitroglycerin (Nitrostat) 0.4 mg SL tablet       triamterene-hydrochlorothiazid (Dyazide) 37.5-25 mg capsule Take 1 capsule by mouth every other day.       No current facility-administered medications for this visit.            FAMILY HISTORY:   No family history on file.     ALLERGY: Penicillins    SOCIAL HISTORY: He  reports no history of alcohol use. He  reports no history of drug use.            Medication reviewed in e-chart  Patient is monitored for medication toxicity  labs reviewed and interpreted independently, X rays independently reviewed  Notes from other physicians involved in care were reviewed  Spent 70 minutes total time including both face-to-face examining patient, discussing treatment options, educating and counseling as well as non-face-to-face time including previsit and post visit time including reviewing test results, communicating with other healthcare providers involved, coordinating care and documenting patient's care in EMR.  Charting was completed using voice recognition technology and may include unintended " errors.    TEX KINCAID MD, OLIVIA.    Tyrone Shane Master Clinician in Hematology and Oncology  Medical Director, [facility] cancer Center at [facility].  [place] office  Phone [phone]  Fax      [phone]  [facility] /[place].  Phone [phone]  Fax     [phone]

## 2024-05-15 ENCOUNTER — TELEPHONE (OUTPATIENT)
Dept: HEMATOLOGY/ONCOLOGY | Facility: CLINIC | Age: 74
End: 2024-05-15
Payer: MEDICARE

## 2024-05-15 NOTE — TELEPHONE ENCOUNTER
"Patient was told to return to [facility] to have sutures removed after port placement.  Patient showed up at [facility] today on second floor and he got the \"runaround\" about having a physician take them out.    Patient inquired since he is coming for labs tomorrow at 1:30 could nurse take them out.  Informed patient that unfortunately that is not an option.    Told patient I would place call to IR at [facility] and see if they can provide some insight as to having these removed.  Patient appreciative and will await call back.    I left message for IR team to please call and advise.  "

## 2024-05-15 NOTE — TELEPHONE ENCOUNTER
Received call from [facility] at [facility] and patient should have come to the main floor Radiology not the surgical floor.    Patient advised and will return to [facility] tomorrow morning.

## 2024-05-16 ENCOUNTER — INFUSION (OUTPATIENT)
Dept: HEMATOLOGY/ONCOLOGY | Facility: CLINIC | Age: 74
End: 2024-05-16
Payer: MEDICARE

## 2024-05-16 ENCOUNTER — PATIENT OUTREACH (OUTPATIENT)
Dept: HEMATOLOGY/ONCOLOGY | Facility: CLINIC | Age: 74
End: 2024-05-16
Payer: MEDICARE

## 2024-05-16 VITALS
TEMPERATURE: 98.8 F | SYSTOLIC BLOOD PRESSURE: 99 MMHG | HEART RATE: 116 BPM | WEIGHT: 207.67 LBS | OXYGEN SATURATION: 96 % | DIASTOLIC BLOOD PRESSURE: 56 MMHG | RESPIRATION RATE: 18 BRPM | BODY MASS INDEX: 35.63 KG/M2

## 2024-05-16 DIAGNOSIS — R91.1 PULMONARY NODULE: ICD-10-CM

## 2024-05-16 DIAGNOSIS — C67.9 MALIGNANT NEOPLASM OF URINARY BLADDER, UNSPECIFIED SITE (MULTI): ICD-10-CM

## 2024-05-16 LAB
BASOPHILS # BLD AUTO: 0.01 X10*3/UL (ref 0–0.1)
BASOPHILS NFR BLD AUTO: 0.2 %
EOSINOPHIL # BLD AUTO: 0.19 X10*3/UL (ref 0–0.4)
EOSINOPHIL NFR BLD AUTO: 4.2 %
ERYTHROCYTE [DISTWIDTH] IN BLOOD BY AUTOMATED COUNT: 14.8 % (ref 11.5–14.5)
HCT VFR BLD AUTO: 29.6 % (ref 41–52)
HGB BLD-MCNC: 9.7 G/DL (ref 13.5–17.5)
IMM GRANULOCYTES # BLD AUTO: 0.01 X10*3/UL (ref 0–0.5)
IMM GRANULOCYTES NFR BLD AUTO: 0.2 % (ref 0–0.9)
LYMPHOCYTES # BLD AUTO: 0.39 X10*3/UL (ref 0.8–3)
LYMPHOCYTES NFR BLD AUTO: 8.6 %
MCH RBC QN AUTO: 30.5 PG (ref 26–34)
MCHC RBC AUTO-ENTMCNC: 32.8 G/DL (ref 32–36)
MCV RBC AUTO: 93 FL (ref 80–100)
MONOCYTES # BLD AUTO: 0.54 X10*3/UL (ref 0.05–0.8)
MONOCYTES NFR BLD AUTO: 11.9 %
NEUTROPHILS # BLD AUTO: 3.4 X10*3/UL (ref 1.6–5.5)
NEUTROPHILS NFR BLD AUTO: 74.9 %
NRBC BLD-RTO: ABNORMAL /100{WBCS}
PLATELET # BLD AUTO: 168 X10*3/UL (ref 150–450)
RBC # BLD AUTO: 3.18 X10*6/UL (ref 4.5–5.9)
WBC # BLD AUTO: 4.5 X10*3/UL (ref 4.4–11.3)

## 2024-05-16 PROCEDURE — 36591 DRAW BLOOD OFF VENOUS DEVICE: CPT

## 2024-05-16 PROCEDURE — 84075 ASSAY ALKALINE PHOSPHATASE: CPT

## 2024-05-16 PROCEDURE — 2500000004 HC RX 250 GENERAL PHARMACY W/ HCPCS (ALT 636 FOR OP/ED): Performed by: INTERNAL MEDICINE

## 2024-05-16 PROCEDURE — 85025 COMPLETE CBC W/AUTO DIFF WBC: CPT

## 2024-05-16 RX ORDER — HEPARIN 100 UNIT/ML
500 SYRINGE INTRAVENOUS AS NEEDED
Status: DISCONTINUED | OUTPATIENT
Start: 2024-05-16 | End: 2024-05-16 | Stop reason: HOSPADM

## 2024-05-16 RX ORDER — HEPARIN 100 UNIT/ML
500 SYRINGE INTRAVENOUS AS NEEDED
Status: CANCELLED | OUTPATIENT
Start: 2024-05-16

## 2024-05-16 RX ORDER — HEPARIN SODIUM,PORCINE/PF 10 UNIT/ML
50 SYRINGE (ML) INTRAVENOUS AS NEEDED
Status: CANCELLED | OUTPATIENT
Start: 2024-05-16

## 2024-05-16 RX ADMIN — HEPARIN 500 UNITS: 100 SYRINGE at 13:40

## 2024-05-16 ASSESSMENT — PAIN SCALES - GENERAL: PAINLEVEL: 3

## 2024-05-16 NOTE — PROGRESS NOTES
Call pl aced to patient to follow-up after first treatment.  No answer.  Message left on identifiable voicemail with call back instructions.

## 2024-05-17 ENCOUNTER — INFUSION (OUTPATIENT)
Dept: HEMATOLOGY/ONCOLOGY | Facility: CLINIC | Age: 74
End: 2024-05-17
Payer: MEDICARE

## 2024-05-17 ENCOUNTER — NUTRITION (OUTPATIENT)
Dept: HEMATOLOGY/ONCOLOGY | Facility: CLINIC | Age: 74
End: 2024-05-17

## 2024-05-17 VITALS
RESPIRATION RATE: 16 BRPM | TEMPERATURE: 99 F | WEIGHT: 206.46 LBS | DIASTOLIC BLOOD PRESSURE: 66 MMHG | HEART RATE: 104 BPM | BODY MASS INDEX: 35.42 KG/M2 | OXYGEN SATURATION: 100 % | SYSTOLIC BLOOD PRESSURE: 102 MMHG

## 2024-05-17 DIAGNOSIS — C67.0 MALIGNANT NEOPLASM OF TRIGONE OF URINARY BLADDER (MULTI): ICD-10-CM

## 2024-05-17 DIAGNOSIS — C67.9 MALIGNANT NEOPLASM OF URINARY BLADDER, UNSPECIFIED SITE (MULTI): ICD-10-CM

## 2024-05-17 DIAGNOSIS — R91.1 PULMONARY NODULE: ICD-10-CM

## 2024-05-17 LAB
ALBUMIN SERPL BCP-MCNC: 3.8 G/DL (ref 3.4–5)
ALP SERPL-CCNC: 141 U/L (ref 33–136)
ALT SERPL W P-5'-P-CCNC: 25 U/L (ref 10–52)
ANION GAP SERPL CALC-SCNC: 17 MMOL/L (ref 10–20)
AST SERPL W P-5'-P-CCNC: 36 U/L (ref 9–39)
BILIRUB SERPL-MCNC: 0.7 MG/DL (ref 0–1.2)
BUN SERPL-MCNC: 43 MG/DL (ref 6–23)
CALCIUM SERPL-MCNC: 9.3 MG/DL (ref 8.6–10.6)
CHLORIDE SERPL-SCNC: 104 MMOL/L (ref 98–107)
CO2 SERPL-SCNC: 20 MMOL/L (ref 21–32)
CREAT SERPL-MCNC: 1.95 MG/DL (ref 0.5–1.3)
EGFRCR SERPLBLD CKD-EPI 2021: 36 ML/MIN/1.73M*2
GLUCOSE SERPL-MCNC: 130 MG/DL (ref 74–99)
POTASSIUM SERPL-SCNC: 5 MMOL/L (ref 3.5–5.3)
PROT SERPL-MCNC: 6.3 G/DL (ref 6.4–8.2)
SODIUM SERPL-SCNC: 136 MMOL/L (ref 136–145)

## 2024-05-17 PROCEDURE — 2500000004 HC RX 250 GENERAL PHARMACY W/ HCPCS (ALT 636 FOR OP/ED): Performed by: INTERNAL MEDICINE

## 2024-05-17 PROCEDURE — 96413 CHEMO IV INFUSION 1 HR: CPT

## 2024-05-17 PROCEDURE — 96375 TX/PRO/DX INJ NEW DRUG ADDON: CPT | Mod: INF

## 2024-05-17 PROCEDURE — 96360 HYDRATION IV INFUSION INIT: CPT | Mod: INF

## 2024-05-17 RX ORDER — EPINEPHRINE 0.3 MG/.3ML
0.3 INJECTION SUBCUTANEOUS EVERY 5 MIN PRN
Status: CANCELLED | OUTPATIENT
Start: 2024-05-17

## 2024-05-17 RX ORDER — EPINEPHRINE 0.3 MG/.3ML
0.3 INJECTION SUBCUTANEOUS EVERY 5 MIN PRN
Status: DISCONTINUED | OUTPATIENT
Start: 2024-05-17 | End: 2024-05-17 | Stop reason: HOSPADM

## 2024-05-17 RX ORDER — PROCHLORPERAZINE EDISYLATE 5 MG/ML
10 INJECTION INTRAMUSCULAR; INTRAVENOUS EVERY 6 HOURS PRN
Status: DISCONTINUED | OUTPATIENT
Start: 2024-05-17 | End: 2024-05-17 | Stop reason: HOSPADM

## 2024-05-17 RX ORDER — SODIUM CHLORIDE 9 MG/ML
1000 INJECTION, SOLUTION INTRAVENOUS CONTINUOUS
Status: CANCELLED | OUTPATIENT
Start: 2024-05-17

## 2024-05-17 RX ORDER — FAMOTIDINE 10 MG/ML
20 INJECTION INTRAVENOUS ONCE AS NEEDED
Status: CANCELLED | OUTPATIENT
Start: 2024-05-17

## 2024-05-17 RX ORDER — HEPARIN 100 UNIT/ML
500 SYRINGE INTRAVENOUS AS NEEDED
Status: DISCONTINUED | OUTPATIENT
Start: 2024-05-17 | End: 2024-05-17 | Stop reason: HOSPADM

## 2024-05-17 RX ORDER — ALBUTEROL SULFATE 0.83 MG/ML
3 SOLUTION RESPIRATORY (INHALATION) AS NEEDED
Status: CANCELLED | OUTPATIENT
Start: 2024-05-17

## 2024-05-17 RX ORDER — PROCHLORPERAZINE MALEATE 10 MG
10 TABLET ORAL EVERY 6 HOURS PRN
Status: DISCONTINUED | OUTPATIENT
Start: 2024-05-17 | End: 2024-05-17 | Stop reason: HOSPADM

## 2024-05-17 RX ORDER — ONDANSETRON HYDROCHLORIDE 8 MG/1
8 TABLET, FILM COATED ORAL 2 TIMES DAILY PRN
Qty: 30 TABLET | Refills: 1 | Status: SHIPPED | OUTPATIENT
Start: 2024-05-17 | End: 2024-06-05

## 2024-05-17 RX ORDER — FAMOTIDINE 10 MG/ML
20 INJECTION INTRAVENOUS ONCE AS NEEDED
Status: DISCONTINUED | OUTPATIENT
Start: 2024-05-17 | End: 2024-05-17 | Stop reason: HOSPADM

## 2024-05-17 RX ORDER — HEPARIN SODIUM,PORCINE/PF 10 UNIT/ML
50 SYRINGE (ML) INTRAVENOUS AS NEEDED
Status: CANCELLED | OUTPATIENT
Start: 2024-05-17

## 2024-05-17 RX ORDER — ONDANSETRON HYDROCHLORIDE 2 MG/ML
8 INJECTION, SOLUTION INTRAVENOUS ONCE
Status: COMPLETED | OUTPATIENT
Start: 2024-05-17 | End: 2024-05-17

## 2024-05-17 RX ORDER — DIPHENHYDRAMINE HYDROCHLORIDE 50 MG/ML
50 INJECTION INTRAMUSCULAR; INTRAVENOUS AS NEEDED
Status: CANCELLED | OUTPATIENT
Start: 2024-05-17

## 2024-05-17 RX ORDER — ACETAMINOPHEN 325 MG/1
650 TABLET ORAL ONCE
Status: COMPLETED | OUTPATIENT
Start: 2024-05-17 | End: 2024-05-17

## 2024-05-17 RX ORDER — HEPARIN 100 UNIT/ML
500 SYRINGE INTRAVENOUS AS NEEDED
Status: CANCELLED | OUTPATIENT
Start: 2024-05-17

## 2024-05-17 RX ORDER — DIPHENHYDRAMINE HYDROCHLORIDE 50 MG/ML
50 INJECTION INTRAMUSCULAR; INTRAVENOUS AS NEEDED
Status: DISCONTINUED | OUTPATIENT
Start: 2024-05-17 | End: 2024-05-17 | Stop reason: HOSPADM

## 2024-05-17 RX ORDER — ALBUTEROL SULFATE 0.83 MG/ML
3 SOLUTION RESPIRATORY (INHALATION) AS NEEDED
Status: DISCONTINUED | OUTPATIENT
Start: 2024-05-17 | End: 2024-05-17 | Stop reason: HOSPADM

## 2024-05-17 RX ORDER — DEXTROMETHORPHAN HYDROBROMIDE, GUAIFENESIN 5; 100 MG/5ML; MG/5ML
650 LIQUID ORAL ONCE
Status: DISCONTINUED | OUTPATIENT
Start: 2024-05-17 | End: 2024-05-17

## 2024-05-17 RX ORDER — SODIUM CHLORIDE 9 MG/ML
1000 INJECTION, SOLUTION INTRAVENOUS CONTINUOUS
Status: DISCONTINUED | OUTPATIENT
Start: 2024-05-17 | End: 2024-05-17 | Stop reason: HOSPADM

## 2024-05-17 RX ADMIN — HEPARIN 500 UNITS: 100 SYRINGE at 16:08

## 2024-05-17 RX ADMIN — ENFORTUMAB VEDOTIN 120 MG: 30 INJECTION, POWDER, LYOPHILIZED, FOR SOLUTION INTRAVENOUS at 15:14

## 2024-05-17 RX ADMIN — ACETAMINOPHEN 650 MG: 325 TABLET ORAL at 14:21

## 2024-05-17 RX ADMIN — ONDANSETRON 8 MG: 2 INJECTION, SOLUTION INTRAMUSCULAR; INTRAVENOUS at 14:20

## 2024-05-17 ASSESSMENT — PAIN SCALES - GENERAL
PAINLEVEL: 7
PAINLEVEL_OUTOF10: 7

## 2024-05-17 ASSESSMENT — PAIN DESCRIPTION - LOCATION: LOCATION: KNEE

## 2024-05-17 ASSESSMENT — PAIN - FUNCTIONAL ASSESSMENT: PAIN_FUNCTIONAL_ASSESSMENT: 0-10

## 2024-05-17 ASSESSMENT — PAIN DESCRIPTION - ORIENTATION: ORIENTATION: RIGHT;LEFT

## 2024-05-17 NOTE — PROGRESS NOTES
"Port deaccessed without incident.  No redness, swelling or bleeding noted. Bandaid applied.  Call back instructions reviewed.  Patient verbalized understanding. Patient off unit via wheelchair accompanied by this rn and patient wife.  In NAD and states he \"feels better\".  Assisted to private vehicle. Patient and wife aware of next appt date/time.    " Dutasteride Pregnancy And Lactation Text: This medication is absolutely contraindicated in women, especially during pregnancy and breast feeding. Feminization of male fetuses is possible if taking while pregnant.

## 2024-05-17 NOTE — PROGRESS NOTES
"Patient has intermittent flushing of the face and upper neck/chest and intermittent chills.  Wife and patient admit that neither of these are new and have been happening prior to chemotherapy.  Instructed patient/wife on reporting to the ED/call provider office with temp of 100.4 or higher. Antiemetic sent to pharmacy per NP for patient complaint of \"upset stomach\" at home after eating or just drinking water.  Instructed on zofran use by NP.  Instructed by NP to call provider if nausea/upset stomach lasts longer than a week.  Tylenol given for complaints of arthritic pain in bilat knees and lower back.  Patient states he has lost his appetite since he started chemo last week.  Heart rate noted earlier and tongue appears somewhat dry.  IVF given prior to immunotherapy.  Patient and wife aware, agreeable, and appreciative of care.   "

## 2024-05-17 NOTE — PROGRESS NOTES
NUTRITION Assessment NOTE    Nutrition Assessment     Reason for Visit:  Keenan Reveles is a 73 y.o. 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   -s/p combined chemoradiation, completed Dec 2023  -April 2024, metastatic lymphadenopathy, recurrent/metastatic urothelial carcinoma  -Began Enfortumab/Keytruda May 2024    Referral received for poor appetite     Lab Results   Component Value Date/Time    GLUCOSE 130 (H) 05/16/2024 1333     05/16/2024 1333    K 5.0 05/16/2024 1333     05/16/2024 1333    CO2 20 (L) 05/16/2024 1333    ANIONGAP 17 05/16/2024 1333    BUN 43 (H) 05/16/2024 1333    CREATININE 1.95 (H) 05/16/2024 1333    EGFR 36 (L) 05/16/2024 1333    CALCIUM 9.3 05/16/2024 1333    ALBUMIN 3.8 05/16/2024 1333    ALKPHOS 141 (H) 05/16/2024 1333    PROT 6.3 (L) 05/16/2024 1333    AST 36 05/16/2024 1333    BILITOT 0.7 05/16/2024 1333    ALT 25 05/16/2024 1333     Lab Results   Component Value Date/Time    VITD25 45 05/01/2020 0910       Anthropometrics:  Anthropometrics  Height:  (162.6 cm)  IBW/kg (Dietitian Calculated): 59 kg  Percent of IBW:  (157%)  Adjusted Body Weight (kg):  (67.6 kg)  Weight Change  Weight History / % Weight Change: 7.5%  Significant Weight Loss: Yes  Interpretation of Weight Loss: 7.5% in 3 months        Wt Readings from Last 10 Encounters:   05/17/24 93.6 kg (206 lb 7.4 oz)   05/16/24 94.2 kg (207 lb 10.8 oz)   05/10/24 94 kg (207 lb 3.7 oz)   05/10/24 90.9 kg (200 lb 8.1 oz)   04/26/24 97.3 kg (214 lb 6.4 oz)   03/22/24 101 kg (221 lb 10.8 oz)   02/13/24 100 kg (220 lb 7.4 oz)   12/05/23 98.4 kg (216 lb 14.9 oz)   11/27/23 97.6 kg (215 lb 2.7 oz)   11/20/23 98 kg (215 lb 15.1 oz)        Food And Nutrient Intake:  Food and Nutrient History  Food and Nutrient History: Patient reports decreased appetite and weight loss. He does report experiencing nausea that is also interfering with ability to eat. He was not previously taking anti-emetics at home.  He did  receive a rx today for Zofran.  He is eating very small meals 2-3x/day . This am for breakfast he had cereal with fruit. States his portions are much smaller.  He has not tried oral nutrition supplement shakes such as Boost or Ensure.  GI Symptoms: nausea      Nutrition Focused Physical Exam Findings:      Subcutaneous Fat Loss  Buccal Fat Pads: Mild-Moderate (flat cheeks, minimal bounce)    Muscle Wasting  Temporalis: Mild-Moderate (slight depression)      Energy Needs  Calculated Energy Needs Using Equations  Height:  (162.6 cm)  Estimated Energy Needs  Total Energy Estimated Needs (kCal):  (4582-1085 cals)  Estimated Fluid Needs  Total Fluid Estimated Needs (mL):  (2000 mls)  Estimated Protein Needs  Total Protein Estimated Needs (g):  (80-95 g)        Nutrition Diagnosis   Malnutrition Diagnosis  Patient has Malnutrition Diagnosis: Yes  Diagnosis Status: New  Malnutrition Diagnosis: Mild malnutrition related to chronic disease or condition  As Evidenced by: 7.5% wt loss in 2 months, reports of fair-poor oral intake, and mild muscle/fat loss    Nutrition Diagnosis  Patient has Nutrition Diagnosis: Yes  Diagnosis Status (1): Ongoing  Nutrition Diagnosis 1: Increased nutrient needs  Related to (1): disease process and treatment  As Evidenced by (1): increased metabolic demands to prevent catabolism    Nutrition Interventions/Recommendations   Nutrition Prescription  Individualized Nutrition Prescription Provided for : Baca diet in times of GI distress; High Claus/High Protein; 4-6 Small freq meals/snacks    Food and Nutrition Delivery  Food and Nutrition Delivery  Meals & Snacks: Energy-modified diet, Protein-modified diet    Nutrition Education  Nutrition Education  Nutrition Education Content: Content related nutrition education  Encouraged taking anti-emetic 30 minutes before breakfast so that it will be effective by the time he is able to eat.  He is able to take q 8 hours.  Suggested small frequent meals/snacks  "q 3 hours.   Provided and reviewed snack suggestions and gave handout \"High Calorie Snack and Menu Ideas\" (AND, Oncology Tool Kit)  Encouraged trialing oral nutrition supplement shakes to optimize pierre/pro intake (provided samples of Ensure Clear,   Ensure Complete, and Orgain), as well as Ensure coupons to assist with cost savings.    Encouraged high protein sources with all meals and snacks (I.e hard boiled eggs, yogurt, peanut butter, cheese, meat etc)    Discouraged greasy/fried foods and high fiber foods such as raw veggies/salads when queasy as these are harder to digest and hang around int the stomach longer.    Coordination of Care  Coordination of Nutrition Care by a Nutrition Professional  Collaboration and referral of nutrition care: Collaboration by nutrition professional with other providers    There are no Patient Instructions on file for this visit.    Nutrition Monitoring and Evaluation   Food/Nutrient Related History Monitoring  Monitoring and Evaluation Plan: Meal/snack pattern, Protein intake, Energy intake  Energy Intake: Estimated energy intake  Criteria: Meet >75% estimated kcal needs  Meal/Snack Pattern: Estimated meal and snack pattern  Criteria: 4-6 small freq meals  Estimated protein intake: Estimated protein intake  Criteria: Meet >75% estimated pro needs          Time Spent  Prep time on day of patient encounter: 10 minutes  Time spent directly with patient, family or caregiver: 15 minutes  Additional Time Spent on Patient Care Activities: 5 minutes  Documentation Time: 30 minutes  Other Time Spent: 0 minutes  Total: 60 minutes          Level of Understanding : Good    "

## 2024-05-21 ENCOUNTER — TELEPHONE (OUTPATIENT)
Dept: HEMATOLOGY/ONCOLOGY | Facility: CLINIC | Age: 74
End: 2024-05-21
Payer: MEDICARE

## 2024-05-21 NOTE — TELEPHONE ENCOUNTER
"Call returned to patient.  States he is \"not feeling to well\".   Patient sounds winded on the phone.  Nurse inquired if he was ambulating patient says no he is \"laying down in bed\".  States he is very short of breath and his lips and mouth are all swollen.  States swelling started 3-4 days ago.  States when he eats he gets nauseated.  He was taking Zofran which is not helping.  States he also continues to have diarrhea.  Discussed with Dr Jarquin.  Patient to proceed to ER now.  Patient agreeable and will go to [facility].     "

## 2024-05-21 NOTE — TELEPHONE ENCOUNTER
Medicine he received for an upset stomach is not helping.  Whenever he eats he gets sick.  He ate a half of sandwich and vomited.    Please advise.

## 2024-05-24 DIAGNOSIS — C67.9 MALIGNANT NEOPLASM OF URINARY BLADDER, UNSPECIFIED SITE (MULTI): Primary | ICD-10-CM

## 2024-05-24 RX ORDER — DIPHENHYDRAMINE HYDROCHLORIDE 50 MG/ML
50 INJECTION INTRAMUSCULAR; INTRAVENOUS AS NEEDED
Status: CANCELLED | OUTPATIENT
Start: 2024-05-25

## 2024-05-24 RX ORDER — EPINEPHRINE 0.3 MG/.3ML
0.3 INJECTION SUBCUTANEOUS EVERY 5 MIN PRN
Status: CANCELLED | OUTPATIENT
Start: 2024-06-01

## 2024-05-24 RX ORDER — ONDANSETRON HYDROCHLORIDE 2 MG/ML
8 INJECTION, SOLUTION INTRAVENOUS ONCE
Status: CANCELLED | OUTPATIENT
Start: 2024-06-01

## 2024-05-24 RX ORDER — EPINEPHRINE 0.3 MG/.3ML
0.3 INJECTION SUBCUTANEOUS EVERY 5 MIN PRN
Status: CANCELLED | OUTPATIENT
Start: 2024-05-25

## 2024-05-24 RX ORDER — PROCHLORPERAZINE MALEATE 10 MG
10 TABLET ORAL EVERY 6 HOURS PRN
Status: CANCELLED | OUTPATIENT
Start: 2024-05-25

## 2024-05-24 RX ORDER — ONDANSETRON HYDROCHLORIDE 2 MG/ML
8 INJECTION, SOLUTION INTRAVENOUS ONCE
Status: CANCELLED | OUTPATIENT
Start: 2024-05-25

## 2024-05-24 RX ORDER — FAMOTIDINE 10 MG/ML
20 INJECTION INTRAVENOUS ONCE AS NEEDED
Status: CANCELLED | OUTPATIENT
Start: 2024-05-25

## 2024-05-24 RX ORDER — PROCHLORPERAZINE EDISYLATE 5 MG/ML
10 INJECTION INTRAMUSCULAR; INTRAVENOUS EVERY 6 HOURS PRN
Status: CANCELLED | OUTPATIENT
Start: 2024-06-01

## 2024-05-24 RX ORDER — PROCHLORPERAZINE MALEATE 10 MG
10 TABLET ORAL EVERY 6 HOURS PRN
Status: CANCELLED | OUTPATIENT
Start: 2024-06-01

## 2024-05-24 RX ORDER — DIPHENHYDRAMINE HYDROCHLORIDE 50 MG/ML
50 INJECTION INTRAMUSCULAR; INTRAVENOUS AS NEEDED
Status: CANCELLED | OUTPATIENT
Start: 2024-06-01

## 2024-05-24 RX ORDER — ALBUTEROL SULFATE 0.83 MG/ML
3 SOLUTION RESPIRATORY (INHALATION) AS NEEDED
Status: CANCELLED | OUTPATIENT
Start: 2024-06-01

## 2024-05-24 RX ORDER — FAMOTIDINE 10 MG/ML
20 INJECTION INTRAVENOUS ONCE AS NEEDED
Status: CANCELLED | OUTPATIENT
Start: 2024-06-01

## 2024-05-24 RX ORDER — PROCHLORPERAZINE EDISYLATE 5 MG/ML
10 INJECTION INTRAMUSCULAR; INTRAVENOUS EVERY 6 HOURS PRN
Status: CANCELLED | OUTPATIENT
Start: 2024-05-25

## 2024-05-24 RX ORDER — ALBUTEROL SULFATE 0.83 MG/ML
3 SOLUTION RESPIRATORY (INHALATION) AS NEEDED
Status: CANCELLED | OUTPATIENT
Start: 2024-05-25

## 2024-05-30 ENCOUNTER — APPOINTMENT (OUTPATIENT)
Dept: HEMATOLOGY/ONCOLOGY | Facility: CLINIC | Age: 74
End: 2024-05-30
Payer: MEDICARE

## 2024-05-31 ENCOUNTER — APPOINTMENT (OUTPATIENT)
Dept: HEMATOLOGY/ONCOLOGY | Facility: CLINIC | Age: 74
End: 2024-05-31
Payer: MEDICARE

## 2024-06-06 ENCOUNTER — APPOINTMENT (OUTPATIENT)
Dept: HEMATOLOGY/ONCOLOGY | Facility: CLINIC | Age: 74
End: 2024-06-06
Payer: MEDICARE

## 2024-06-07 ENCOUNTER — APPOINTMENT (OUTPATIENT)
Dept: HEMATOLOGY/ONCOLOGY | Facility: CLINIC | Age: 74
End: 2024-06-07
Payer: MEDICARE